# Patient Record
Sex: FEMALE | Race: WHITE | NOT HISPANIC OR LATINO | Employment: OTHER | URBAN - METROPOLITAN AREA
[De-identification: names, ages, dates, MRNs, and addresses within clinical notes are randomized per-mention and may not be internally consistent; named-entity substitution may affect disease eponyms.]

---

## 2017-04-06 PROBLEM — G89.29 BILATERAL CHRONIC KNEE PAIN: Status: ACTIVE | Noted: 2017-04-06

## 2017-04-06 PROBLEM — M25.561 BILATERAL CHRONIC KNEE PAIN: Status: ACTIVE | Noted: 2017-04-06

## 2017-04-06 PROBLEM — M25.562 BILATERAL CHRONIC KNEE PAIN: Status: ACTIVE | Noted: 2017-04-06

## 2017-04-06 PROBLEM — M22.41 PATELLOFEMORAL CHONDROSIS OF RIGHT KNEE: Status: ACTIVE | Noted: 2017-04-06

## 2018-03-17 PROBLEM — R45.851 SUICIDAL IDEATION: Status: ACTIVE | Noted: 2018-03-17

## 2018-09-14 PROBLEM — F41.0 PANIC DISORDER: Status: ACTIVE | Noted: 2018-09-14

## 2018-09-14 PROBLEM — F31.62 MIXED BIPOLAR AFFECTIVE DISORDER, MODERATE: Status: ACTIVE | Noted: 2018-09-14

## 2018-12-03 ENCOUNTER — HOSPITAL ENCOUNTER (INPATIENT)
Facility: HOSPITAL | Age: 28
LOS: 3 days | Discharge: HOME | DRG: 885 | End: 2018-12-06
Attending: PSYCHIATRY & NEUROLOGY | Admitting: PSYCHIATRY & NEUROLOGY

## 2018-12-03 ENCOUNTER — HOSPITAL ENCOUNTER (EMERGENCY)
Facility: HOSPITAL | Age: 28
Discharge: PSYCHIATRIC OR SUBSTANCE USE FACILITY | End: 2018-12-03
Attending: SURGERY

## 2018-12-03 VITALS
HEART RATE: 86 BPM | TEMPERATURE: 98 F | DIASTOLIC BLOOD PRESSURE: 76 MMHG | BODY MASS INDEX: 46.59 KG/M2 | WEIGHT: 280 LBS | RESPIRATION RATE: 18 BRPM | SYSTOLIC BLOOD PRESSURE: 134 MMHG

## 2018-12-03 DIAGNOSIS — F32.A DEPRESSION WITH SUICIDAL IDEATION: ICD-10-CM

## 2018-12-03 DIAGNOSIS — N94.6 MENSTRUAL CRAMPS: ICD-10-CM

## 2018-12-03 DIAGNOSIS — R45.851 DEPRESSION WITH SUICIDAL IDEATION: ICD-10-CM

## 2018-12-03 DIAGNOSIS — F33.2 SEVERE EPISODE OF RECURRENT MAJOR DEPRESSIVE DISORDER, WITHOUT PSYCHOTIC FEATURES: Primary | ICD-10-CM

## 2018-12-03 DIAGNOSIS — F41.9 ANXIETY: ICD-10-CM

## 2018-12-03 DIAGNOSIS — R45.851 DEPRESSION WITH SUICIDAL IDEATION: Primary | ICD-10-CM

## 2018-12-03 DIAGNOSIS — F32.A DEPRESSION WITH SUICIDAL IDEATION: Primary | ICD-10-CM

## 2018-12-03 LAB
ALBUMIN SERPL BCP-MCNC: 3.8 G/DL
ALP SERPL-CCNC: 94 U/L
ALT SERPL W/O P-5'-P-CCNC: 28 U/L
AMPHET+METHAMPHET UR QL: NEGATIVE
ANION GAP SERPL CALC-SCNC: 11 MMOL/L
APAP SERPL-MCNC: <3 UG/ML
AST SERPL-CCNC: 27 U/L
BACTERIA #/AREA URNS HPF: ABNORMAL /HPF
BARBITURATES UR QL SCN>200 NG/ML: NEGATIVE
BASOPHILS # BLD AUTO: 0.04 K/UL
BASOPHILS NFR BLD: 0.3 %
BENZODIAZ UR QL SCN>200 NG/ML: NEGATIVE
BILIRUB SERPL-MCNC: 0.2 MG/DL
BILIRUB UR QL STRIP: NEGATIVE
BUN SERPL-MCNC: 13 MG/DL
BZE UR QL SCN: NEGATIVE
CALCIUM SERPL-MCNC: 8.9 MG/DL
CANNABINOIDS UR QL SCN: NEGATIVE
CHLORIDE SERPL-SCNC: 105 MMOL/L
CLARITY UR: CLEAR
CO2 SERPL-SCNC: 23 MMOL/L
COLOR UR: YELLOW
CREAT SERPL-MCNC: 0.9 MG/DL
CREAT UR-MCNC: 195.1 MG/DL
DIFFERENTIAL METHOD: ABNORMAL
EOSINOPHIL # BLD AUTO: 0.1 K/UL
EOSINOPHIL NFR BLD: 0.9 %
ERYTHROCYTE [DISTWIDTH] IN BLOOD BY AUTOMATED COUNT: 13.1 %
EST. GFR  (AFRICAN AMERICAN): >60 ML/MIN/1.73 M^2
EST. GFR  (NON AFRICAN AMERICAN): >60 ML/MIN/1.73 M^2
ETHANOL SERPL-MCNC: <10 MG/DL
GLUCOSE SERPL-MCNC: 86 MG/DL
GLUCOSE UR QL STRIP: NEGATIVE
HCT VFR BLD AUTO: 40.3 %
HGB BLD-MCNC: 13.2 G/DL
HGB UR QL STRIP: ABNORMAL
HYALINE CASTS #/AREA URNS LPF: 0 /LPF
KETONES UR QL STRIP: NEGATIVE
LEUKOCYTE ESTERASE UR QL STRIP: NEGATIVE
LYMPHOCYTES # BLD AUTO: 2.6 K/UL
LYMPHOCYTES NFR BLD: 22.2 %
MCH RBC QN AUTO: 30.6 PG
MCHC RBC AUTO-ENTMCNC: 32.8 G/DL
MCV RBC AUTO: 94 FL
METHADONE UR QL SCN>300 NG/ML: NEGATIVE
MICROSCOPIC COMMENT: ABNORMAL
MONOCYTES # BLD AUTO: 0.9 K/UL
MONOCYTES NFR BLD: 7.5 %
NEUTROPHILS # BLD AUTO: 8.1 K/UL
NEUTROPHILS NFR BLD: 69.1 %
NITRITE UR QL STRIP: NEGATIVE
OPIATES UR QL SCN: NEGATIVE
PCP UR QL SCN>25 NG/ML: NEGATIVE
PH UR STRIP: 7 [PH] (ref 5–8)
PLATELET # BLD AUTO: 334 K/UL
PMV BLD AUTO: 9.7 FL
POTASSIUM SERPL-SCNC: 4.2 MMOL/L
PROT SERPL-MCNC: 7.5 G/DL
PROT UR QL STRIP: ABNORMAL
RBC # BLD AUTO: 4.31 M/UL
RBC #/AREA URNS HPF: 20 /HPF (ref 0–4)
SALICYLATES SERPL-MCNC: <5 MG/DL
SODIUM SERPL-SCNC: 139 MMOL/L
SP GR UR STRIP: 1.02 (ref 1–1.03)
T4 FREE SERPL-MCNC: 0.83 NG/DL
TOXICOLOGY INFORMATION: NORMAL
TSH SERPL DL<=0.005 MIU/L-ACNC: 1.67 UIU/ML
URN SPEC COLLECT METH UR: ABNORMAL
UROBILINOGEN UR STRIP-ACNC: NEGATIVE EU/DL
WBC # BLD AUTO: 11.78 K/UL
WBC #/AREA URNS HPF: 1 /HPF (ref 0–5)

## 2018-12-03 PROCEDURE — 82306 VITAMIN D 25 HYDROXY: CPT

## 2018-12-03 PROCEDURE — 82607 VITAMIN B-12: CPT

## 2018-12-03 PROCEDURE — 36415 COLL VENOUS BLD VENIPUNCTURE: CPT

## 2018-12-03 PROCEDURE — 80307 DRUG TEST PRSMV CHEM ANLYZR: CPT

## 2018-12-03 PROCEDURE — 80329 ANALGESICS NON-OPIOID 1 OR 2: CPT

## 2018-12-03 PROCEDURE — 80061 LIPID PANEL: CPT

## 2018-12-03 PROCEDURE — 84439 ASSAY OF FREE THYROXINE: CPT

## 2018-12-03 PROCEDURE — 25000003 PHARM REV CODE 250: Performed by: PSYCHIATRY & NEUROLOGY

## 2018-12-03 PROCEDURE — 81000 URINALYSIS NONAUTO W/SCOPE: CPT

## 2018-12-03 PROCEDURE — 80320 DRUG SCREEN QUANTALCOHOLS: CPT

## 2018-12-03 PROCEDURE — 11400000 HC PSYCH PRIVATE ROOM

## 2018-12-03 PROCEDURE — 83036 HEMOGLOBIN GLYCOSYLATED A1C: CPT

## 2018-12-03 PROCEDURE — 99283 EMERGENCY DEPT VISIT LOW MDM: CPT

## 2018-12-03 PROCEDURE — 84443 ASSAY THYROID STIM HORMONE: CPT

## 2018-12-03 PROCEDURE — 84481 FREE ASSAY (FT-3): CPT

## 2018-12-03 PROCEDURE — 82746 ASSAY OF FOLIC ACID SERUM: CPT

## 2018-12-03 PROCEDURE — 85025 COMPLETE CBC W/AUTO DIFF WBC: CPT

## 2018-12-03 PROCEDURE — S4991 NICOTINE PATCH NONLEGEND: HCPCS | Performed by: PSYCHIATRY & NEUROLOGY

## 2018-12-03 PROCEDURE — 80053 COMPREHEN METABOLIC PANEL: CPT

## 2018-12-03 RX ORDER — LORAZEPAM 2 MG/ML
2 INJECTION INTRAMUSCULAR EVERY 4 HOURS PRN
Status: DISCONTINUED | OUTPATIENT
Start: 2018-12-03 | End: 2018-12-03 | Stop reason: HOSPADM

## 2018-12-03 RX ORDER — ACETAMINOPHEN 325 MG/1
650 TABLET ORAL EVERY 6 HOURS PRN
Status: DISCONTINUED | OUTPATIENT
Start: 2018-12-03 | End: 2018-12-06 | Stop reason: HOSPADM

## 2018-12-03 RX ORDER — BUSPIRONE HYDROCHLORIDE 10 MG/1
20 TABLET ORAL 3 TIMES DAILY
Status: ON HOLD | COMMUNITY
End: 2018-12-06 | Stop reason: HOSPADM

## 2018-12-03 RX ORDER — DOCUSATE SODIUM 100 MG/1
100 CAPSULE, LIQUID FILLED ORAL DAILY PRN
Status: DISCONTINUED | OUTPATIENT
Start: 2018-12-03 | End: 2018-12-06 | Stop reason: HOSPADM

## 2018-12-03 RX ORDER — HYDROXYZINE PAMOATE 50 MG/1
100 CAPSULE ORAL NIGHTLY
Status: DISCONTINUED | OUTPATIENT
Start: 2018-12-03 | End: 2018-12-06

## 2018-12-03 RX ORDER — DIPHENHYDRAMINE HYDROCHLORIDE 50 MG/ML
50 INJECTION INTRAMUSCULAR; INTRAVENOUS EVERY 4 HOURS PRN
Status: DISCONTINUED | OUTPATIENT
Start: 2018-12-03 | End: 2018-12-03 | Stop reason: HOSPADM

## 2018-12-03 RX ORDER — HALOPERIDOL 5 MG/ML
5 INJECTION INTRAMUSCULAR EVERY 4 HOURS PRN
Status: DISCONTINUED | OUTPATIENT
Start: 2018-12-03 | End: 2018-12-03 | Stop reason: HOSPADM

## 2018-12-03 RX ORDER — HYDROXYZINE PAMOATE 50 MG/1
50 CAPSULE ORAL EVERY 6 HOURS PRN
Status: DISCONTINUED | OUTPATIENT
Start: 2018-12-03 | End: 2018-12-03

## 2018-12-03 RX ORDER — MAG HYDROX/ALUMINUM HYD/SIMETH 200-200-20
30 SUSPENSION, ORAL (FINAL DOSE FORM) ORAL EVERY 6 HOURS PRN
Status: DISCONTINUED | OUTPATIENT
Start: 2018-12-03 | End: 2018-12-06 | Stop reason: HOSPADM

## 2018-12-03 RX ORDER — FOLIC ACID 1 MG/1
1 TABLET ORAL DAILY
Status: DISCONTINUED | OUTPATIENT
Start: 2018-12-04 | End: 2018-12-06 | Stop reason: HOSPADM

## 2018-12-03 RX ORDER — IBUPROFEN 200 MG
1 TABLET ORAL DAILY
Status: DISCONTINUED | OUTPATIENT
Start: 2018-12-03 | End: 2018-12-06 | Stop reason: HOSPADM

## 2018-12-03 RX ORDER — OLANZAPINE 10 MG/2ML
10 INJECTION, POWDER, FOR SOLUTION INTRAMUSCULAR EVERY 4 HOURS PRN
Status: DISCONTINUED | OUTPATIENT
Start: 2018-12-03 | End: 2018-12-06 | Stop reason: HOSPADM

## 2018-12-03 RX ORDER — IBUPROFEN 200 MG
1 TABLET ORAL
Status: DISCONTINUED | OUTPATIENT
Start: 2018-12-03 | End: 2018-12-03 | Stop reason: HOSPADM

## 2018-12-03 RX ORDER — OLANZAPINE 10 MG/1
10 TABLET ORAL EVERY 4 HOURS PRN
Status: DISCONTINUED | OUTPATIENT
Start: 2018-12-03 | End: 2018-12-06 | Stop reason: HOSPADM

## 2018-12-03 RX ORDER — ALPRAZOLAM 1 MG/1
1 TABLET ORAL 2 TIMES DAILY PRN
Status: ON HOLD | COMMUNITY
End: 2018-12-06 | Stop reason: HOSPADM

## 2018-12-03 RX ORDER — LOPERAMIDE HYDROCHLORIDE 2 MG/1
2 CAPSULE ORAL
Status: DISCONTINUED | OUTPATIENT
Start: 2018-12-03 | End: 2018-12-06 | Stop reason: HOSPADM

## 2018-12-03 RX ADMIN — HYDROXYZINE PAMOATE 100 MG: 50 CAPSULE ORAL at 08:12

## 2018-12-03 RX ADMIN — NICOTINE 1 PATCH: 14 PATCH, EXTENDED RELEASE TRANSDERMAL at 06:12

## 2018-12-03 NOTE — ED NOTES
"Patient denies any SI or HI at this time.  When asked patient states "I'm depressed and I went to the clinic to get another scrip of xanax and they sent me here."    "

## 2018-12-03 NOTE — ED NOTES
Patient has been accepted to our [facility].  Report given to Johan LEARY.  Patient and spouse were informed.  No questions or concerns at this time.

## 2018-12-03 NOTE — ED PROVIDER NOTES
"[facility]                                                 Chief Complaint  28 y.o. female with Suicidal    History of Present Illness  Keyla Caal presents to the emergency room with suicidal ideation  Patient is suicidal and depressed, patient has a history of bipolar disorder  Patient was discussing these issues with her local mental health clinic MD  Patient was advised to come to the ER for full evaluation/BHU placement  Patient is alert and cooperative, states she suffers with depression issues  Patient has multiple personality disorder per her history given in the ER now  Patient also has a history of severe anxiety, chronic pain issues noted today    The history is provided by the patient   device was not used during this ER visit    Past Medical History    Anxiety     [facility]     Arthritis     Bipolar disorder     [facility]    Chronic knee pain     Depression     '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, anger issues."     History of psychiatric hospitalization     [facility]     Hx of psychiatric care     Buspar, Vistril, Xanax, Paxil    Migraines     Primary Care doctor at [facility]     Oppositional defiant disorder     'I spent only one day at [facility] and was discharged."    Panic disorder     [facility]    Psychiatric exam requested by authority     [facility]    Psychiatric problem     Depression and anxiety    Suicide attempt     Interrupted Attempt    Therapy     [facility]      Surgeries:  Bartholin gland, knee surgery, wisdom tooth  No Known Allergies     Review of Systems and Physical Exam      Review of Systems  -- Constitution - no fever, denies fatigue, no weakness, no chills  -- Eyes - no tearing or redness, no visual disturbance  -- Ear, Nose - no tinnitus or earache, no nasal congestion or " discharge  -- Mouth,Throat - no sore throat, no toothache, normal voice, normal swallowing  -- Respiratory - denies cough and congestion, no shortness of breath, no TERESA  -- Cardiovascular - denies chest pain, no palpitations, denies claudication  -- Gastrointestinal - denies abdominal pain, nausea, vomiting, or diarrhea  -- Genitourinary - no dysuria, denies flank pain, no hematuria, no STD risk  -- Musculoskeletal - denies back pain, negative for myalgias and arthralgias   -- Neurological - no headache, denies weakness or seizure; no LOC  -- Skin - denies pallor, rash, or changes in skin. no hives or welts noted  -- Psychiatric - suicidal ideation and depression, no psychosis or fractured thought noted     Vital Signs  Her blood pressure is 134/76 and her pulse is 86. Her respiration is 18.     Physical Exam  -- Nursing note and vitals reviewed  -- Constitutional: Appears well-developed and well-nourished  -- Head: Atraumatic. Normocephalic. No obvious abnormality  -- Eyes: Pupils are equal and reactive to light. Normal conjunctiva and lids  -- Cardiac: Normal rate, regular rhythm and normal heart sounds  -- Pulmonary: Normal respiratory effort, breath sounds clear to auscultation  -- Abdominal: Soft, no tenderness. Normal bowel sounds. Normal liver edge  -- Musculoskeletal: Normal range of motion, no effusions. Joints stable   -- Neurological: No focal deficits. Showed good interaction with staff  -- Vascular: Posterior tibial, dorsalis pedis and radial pulses 2+ bilaterally    -- Lymphatics: No cervical or peripheral lymphadenopathy. No edema noted  -- Skin: Warm and dry. No evidence of rash or cellulitis    Emergency Room Course      Diagnosis  -- The encounter diagnosis was Depression with suicidal ideation.    Disposition and Plan  -- Disposition: PEC  -- Condition: stable  -- Pt will be placed in a psychiatric facility  -- The patient is a direct observation until placement  -- The patient has been made aware  of his or her rights while under PEC in the ER  -- All questions have been answered; will follow ER protocols until placement    Lab work was performed in the ER, this patient is cleared for psychiatric placement     This note is dictated on Dragon Natural Speaking word recognition program.  There are word recognition mistakes that are occasionally missed on review.          Ted Tinoco MD  12/03/18 [id number]

## 2018-12-03 NOTE — ED NOTES
Several small healed cuts on left neck.  Patient reports using a razor blade to cut herself last week.

## 2018-12-03 NOTE — ED TRIAGE NOTES
Patient presents to the ER via [facility] with PEC from behavior clinic in [place].  Patient cut her neck a week ago.  Patient has superficial cuts to neck.  Patient reports depression.

## 2018-12-04 PROBLEM — N94.6 MENSTRUAL CRAMPS: Status: ACTIVE | Noted: 2018-12-04

## 2018-12-04 LAB
25(OH)D3+25(OH)D2 SERPL-MCNC: 22 NG/ML
CHOLEST SERPL-MCNC: 217 MG/DL
CHOLEST/HDLC SERPL: 5.2 {RATIO}
ESTIMATED AVG GLUCOSE: 103 MG/DL
FOLATE SERPL-MCNC: 8.7 NG/ML
HBA1C MFR BLD HPLC: 5.2 %
HDLC SERPL-MCNC: 42 MG/DL
HDLC SERPL: 19.4 %
LDLC SERPL CALC-MCNC: 126.6 MG/DL
NONHDLC SERPL-MCNC: 175 MG/DL
T3FREE SERPL-MCNC: 2.7 PG/ML
TRIGL SERPL-MCNC: 242 MG/DL
VIT B12 SERPL-MCNC: 290 PG/ML

## 2018-12-04 PROCEDURE — 36415 COLL VENOUS BLD VENIPUNCTURE: CPT

## 2018-12-04 PROCEDURE — 99222 1ST HOSP IP/OBS MODERATE 55: CPT | Mod: ,,, | Performed by: NURSE PRACTITIONER

## 2018-12-04 PROCEDURE — S4991 NICOTINE PATCH NONLEGEND: HCPCS | Performed by: PSYCHIATRY & NEUROLOGY

## 2018-12-04 PROCEDURE — 11400000 HC PSYCH PRIVATE ROOM

## 2018-12-04 PROCEDURE — 90833 PSYTX W PT W E/M 30 MIN: CPT | Mod: ,,, | Performed by: PSYCHIATRY & NEUROLOGY

## 2018-12-04 PROCEDURE — 99223 1ST HOSP IP/OBS HIGH 75: CPT | Mod: ,,, | Performed by: PSYCHIATRY & NEUROLOGY

## 2018-12-04 PROCEDURE — 25000003 PHARM REV CODE 250: Performed by: PSYCHIATRY & NEUROLOGY

## 2018-12-04 RX ORDER — LURASIDONE HYDROCHLORIDE 40 MG/1
40 TABLET, FILM COATED ORAL
Status: DISCONTINUED | OUTPATIENT
Start: 2018-12-04 | End: 2018-12-06 | Stop reason: HOSPADM

## 2018-12-04 RX ORDER — TRAZODONE HYDROCHLORIDE 100 MG/1
100 TABLET ORAL NIGHTLY
Status: DISCONTINUED | OUTPATIENT
Start: 2018-12-04 | End: 2018-12-05

## 2018-12-04 RX ORDER — CARBAMAZEPINE 200 MG/1
200 TABLET ORAL 2 TIMES DAILY
Status: DISCONTINUED | OUTPATIENT
Start: 2018-12-04 | End: 2018-12-06 | Stop reason: HOSPADM

## 2018-12-04 RX ORDER — HYDROXYZINE PAMOATE 50 MG/1
50 CAPSULE ORAL EVERY 8 HOURS PRN
Status: DISCONTINUED | OUTPATIENT
Start: 2018-12-04 | End: 2018-12-06 | Stop reason: HOSPADM

## 2018-12-04 RX ORDER — PRAZOSIN HYDROCHLORIDE 1 MG/1
2 CAPSULE ORAL NIGHTLY
Status: DISCONTINUED | OUTPATIENT
Start: 2018-12-04 | End: 2018-12-06 | Stop reason: HOSPADM

## 2018-12-04 RX ORDER — FLUOXETINE HYDROCHLORIDE 20 MG/1
60 CAPSULE ORAL DAILY
Status: DISCONTINUED | OUTPATIENT
Start: 2018-12-04 | End: 2018-12-06 | Stop reason: HOSPADM

## 2018-12-04 RX ORDER — IBUPROFEN 800 MG/1
800 TABLET ORAL EVERY 6 HOURS PRN
Status: DISCONTINUED | OUTPATIENT
Start: 2018-12-04 | End: 2018-12-06 | Stop reason: HOSPADM

## 2018-12-04 RX ADMIN — CARBAMAZEPINE 200 MG: 200 TABLET ORAL at 10:12

## 2018-12-04 RX ADMIN — PRAZOSIN HYDROCHLORIDE 2 MG: 1 CAPSULE ORAL at 08:12

## 2018-12-04 RX ADMIN — ACETAMINOPHEN 650 MG: 325 TABLET ORAL at 04:12

## 2018-12-04 RX ADMIN — HYDROXYZINE PAMOATE 100 MG: 50 CAPSULE ORAL at 08:12

## 2018-12-04 RX ADMIN — IBUPROFEN 800 MG: 800 TABLET ORAL at 10:12

## 2018-12-04 RX ADMIN — LURASIDONE HYDROCHLORIDE 40 MG: 40 TABLET, FILM COATED ORAL at 04:12

## 2018-12-04 RX ADMIN — THERA TABS 1 TABLET: TAB at 08:12

## 2018-12-04 RX ADMIN — IBUPROFEN 800 MG: 800 TABLET ORAL at 07:12

## 2018-12-04 RX ADMIN — TRAZODONE HYDROCHLORIDE 100 MG: 100 TABLET ORAL at 08:12

## 2018-12-04 RX ADMIN — FOLIC ACID 1 MG: 1 TABLET ORAL at 08:12

## 2018-12-04 RX ADMIN — CARBAMAZEPINE 200 MG: 200 TABLET ORAL at 08:12

## 2018-12-04 RX ADMIN — NICOTINE 1 PATCH: 14 PATCH, EXTENDED RELEASE TRANSDERMAL at 09:12

## 2018-12-04 RX ADMIN — FLUOXETINE HYDROCHLORIDE 60 MG: 20 CAPSULE ORAL at 10:12

## 2018-12-04 NOTE — PLAN OF CARE
Problem: Patient Care Overview (Adult)  Goal: Plan of Care Review  Outcome: Ongoing (interventions implemented as appropriate)  Goals: Pt to increase PO intake to 50% while in the hospital   Nutrition Goal Status: new    Nutrition Discharge Planning: Pt to continue on a regular diet and taking multivitamins whn leaving the hospital. Consider healthy fat options if triglycerides and cholesterol remain elevated.

## 2018-12-04 NOTE — PLAN OF CARE
Problem: Patient Care Overview (Adult)  Goal: Plan of Care Review  Outcome: Ongoing (interventions implemented as appropriate)  Lying quiet in bed, eyes closed, respiration even and unlabored, appearing asleep.  Slept well all shift up until about 0425.  Awoke and came to nursing station with c/o headache.  Received Tylenol 650 mg po at 0425.  Pt returned to bed and to sleep by 0500 .  Safety and precautions maintained with rounds every 15 minutes, bed is fixed in a low position and pathways kept clear.  No fall occurred.

## 2018-12-04 NOTE — HPI
29 YO WF with PMHX of bipolar depression, multiple perstonality disorder, severe anxiety, and chronic pain issues presented to to the emergency room with suicidal ideation  Advised to present to ER after discussing issues with  her local mental health clinic MD,  for full evaluation/BHU placement    BP Readings from Last 3 Encounters:   12/04/18 138/76   12/03/18 134/76   09/14/18 128/84

## 2018-12-04 NOTE — PLAN OF CARE
"Problem: Patient Care Overview (Adult)  Goal: Individualization & Mutuality  Outcome: Ongoing (interventions implemented as appropriate)  Pt presented to our Er after being seen [facility] PEC'd for depression and SI , accepted by Dr. Tao Elizabeth, pt up to unit via security and MHT, property and body search performed no paraphernalia found,  pt states 'got into argument with wife Maritza last week after bad day of work and took razor and made small superficial scratches to neck and forearms", pt states" need help fix my medication because I hate feeling like this", pt blunt affect cooperative, speaks softly, pt given tobacco cessation education and pt to receive out pt referral for tobacco cessation, pt contracted for safety, safety precautions maintained, will continue to monitor            "

## 2018-12-04 NOTE — PLAN OF CARE
Problem: Patient Care Overview (Adult)  Goal: Interdisciplinary Rounds/Family Conf  TREATMENT TEAM  Chief Complaint:  Patient admitted with depression SI     Current:  Patient attended treatment team dressed in personal clothes. Patient presents with blunted affect, cooperative mood.   I went to my doctor's appointment and he decided to put me in here. He said he had never seen me this stressed out. Patient states she sees Demetrius Noriega at [facility] and has been diagnosed with biopolar depression.   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 Is unsure what medications she is on.      Plan:  Will meet with staff 1:1 later.   Patient will discharge to home   Patient will FU with [facility]

## 2018-12-04 NOTE — PROGRESS NOTES
" [facility]  Adult Nutrition  Progress Note    SUMMARY       Recommendations    Recommendation/Intervention: (P) Recommend pt to continue on regular diet and taking multivitamins while in the hospital and at home. Continue to moniotor chlesterol/triglycerides and consider heart healthy options/education if these remain elevated  Goals: (P) Pt to increase PO intake to 50% while in the hospital   Nutrition Goal Status: (P) new    Reason for Assessment    Reason for Assessment: consult  Diagnosis: (Depression with suicidal ideation )  General Information Comments: NFPE not completed, not appropriate due to psych status  Nutrition Discharge Planning: Pt to continue on a regular diet and taking multivitamins whn leaving the hospital. Consider healthy fat options if triglycerides and cholesterol remain elevated.     Nutrition Risk Screen    Nutrition Risk Screen: no indicators present    Nutrition/Diet History    Patient Reported Diet/Restrictions/Preferences: general  Typical Food/Fluid Intake: 25% breakfast   Do you have any cultural, spiritual, Amish conflicts, given your current situation?: Pentecostal  Food Allergies: NKFA    Anthropometrics    Temp: 97.2 °F (36.2 °C)  Height: 5' 5" (165.1 cm)  Height (inches): 65 in  Weight Method: Standard Scale  Weight: 127.9 kg (281 lb 15.5 oz)  Weight (lb): 281.97 lb  Ideal Body Weight (IBW), Female: 125 lb  % Ideal Body Weight, Female (lb): 225.58 lb  BMI (Calculated): 47  BMI Grade: greater than 40 - morbid obesity       Lab/Procedures/Meds    Pertinent Labs Reviewed: reviewed  Pertinent Labs Comments: 217 Cholesterol, triglycerides 242, HDL 19.4  Pertinent Medications Reviewed: reviewed  Pertinent Medications Comments: Multivitamin, Folic acid     Physical Findings/Assessment    Overall Physical Appearance: nourished, overweight  Oral/Mouth Cavity: no grinding or difficulty chewing  Skin: intact    Estimated/Assessed Needs    Weight Used For Calorie " Calculations: 127.9 kg (281 lb 15.5 oz)  Energy Calorie Requirements (kcal): 2000(NO AF)  Energy Need Method: [place]-St Jeor  Protein Requirements: 102-128(0.8-1gm protein )  Weight Used For Protein Calculations: 127.9 kg (281 lb 15.5 oz)  Fluid Requirements (mL): 2000  Fluid Need Method: RDA Method  RDA Method (mL): 2000         Nutrition Prescription Ordered    Current Diet Order: Regular    Evaluation of Received Nutrient/Fluid Intake    % Kcal Needs: 25%  % Protein Needs: 25%  Tolerance: tolerating  % Intake of Estimated Energy Needs: 0 - 25 %  % Meal Intake: 0 - 25 %    Nutrition Risk    Level of Risk/Frequency of Follow-up: low     Assessment and Plan  Nutrition Problem  Inadequate oral intake    Related to (etiology):   Pt not eating all meals     Signs and Symptoms (as evidenced by):   Pt PO intake at 25%    Interventions  General/healthful diet  Multivitamin/mineral supplement therapy    Recommendations (treatment strategy):  Recommend pt to continue on regular diet and taking multivitamins while in the hospital and at home. Continue to moniotor chlesterol/triglycerides and consider heart healthy options/education if these remain elevated.    Nutrition Diagnosis Status:   New      Monitor and Evaluation    Food and Nutrient Intake: food and beverage intake  Food and Nutrient Adminstration: diet order  Knowledge/Beliefs/Attitudes: food and nutrition knowledge/skill, beliefs and attitudes  Physical Activity and Function: nutrition-related ADLs and IADLs  Anthropometric Measurements: body mass index, weight change, weight, height/length  Biochemical Data, Medical Tests and Procedures: lipid profile, inflammatory profile, glucose/endocrine profile, gastrointestinal profile, electrolyte and renal panel  Nutrition-Focused Physical Findings: overall appearance     Nutrition Follow-Up    RD Follow-up?: Yes
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
## 2018-12-04 NOTE — PLAN OF CARE
Problem: Patient Care Overview (Adult)  Goal: Plan of Care Review  Outcome: Ongoing (interventions implemented as appropriate)  Pt avoiding social contact, resting in bed mostly waiting to see doctor in morning, denies SI at this time, safety precautions maintained, will continue to monitor

## 2018-12-04 NOTE — SUBJECTIVE & OBJECTIVE
"Past Medical History:   Diagnosis Date    Anxiety 2015    [facility]     Arthritis     Bipolar disorder 2016    [facility]    Chronic knee pain     Depression 2016    '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, anger issues."     History of psychiatric hospitalization 2015    [facility]     Hx of psychiatric care 2015    Buspar, Vistril, Xanax, Paxil    Elaine     Migraines 2014    Primary Care doctor at [facility]     Oppositional defiant disorder 1999    'I spent only one day at [facility] and was discharged."    Panic disorder 2015    [facility]    Psychiatric exam requested by authority 2015    [facility]    Psychiatric problem 2015    Depression and anxiety    Suicide attempt 2015    [facility]    Therapy 2016    [facility]       Past Surgical History:   Procedure Laterality Date    BARTHOLIN GLAND CYST EXCISION      KNEE SURGERY      right    WISDOM TOOTH EXTRACTION         Review of patient's allergies indicates:  No Known Allergies    Current Facility-Administered Medications on File Prior to Encounter   Medication    [DISCONTINUED] diphenhydrAMINE injection 50 mg    [DISCONTINUED] haloperidol lactate injection 5 mg    [DISCONTINUED] lorazepam injection 2 mg    [DISCONTINUED] nicotine 21 mg/24 hr 1 patch     Current Outpatient Medications on File Prior to Encounter   Medication Sig    ALPRAZolam (XANAX) 1 MG tablet Take 1 mg by mouth 2 (two) times daily as needed for Anxiety.    busPIRone (BUSPAR) 10 MG tablet Take 20 mg by mouth 3 (three) times daily.     carBAMazepine (TEGRETOL) 200 mg tablet Take 200 mg by mouth 2 (two) times daily.    diclofenac (VOLTAREN) 50 MG EC tablet Take 1 tablet (50 mg total) by mouth 2 (two) times daily.    FLUoxetine (PROZAC) 20 MG capsule Take 1 capsule (20 mg total) by mouth 2 (two) times daily. (Patient taking " differently: Take 40 mg by mouth 2 (two) times daily. )    furosemide (LASIX) 20 MG tablet Take 1 tablet (20 mg total) by mouth daily as needed.    hydrOXYzine pamoate (VISTARIL) 50 MG Cap Take 1 capsule (50 mg total) by mouth every 8 (eight) hours as needed (first choice).    lurasidone (LATUDA) 40 mg Tab tablet Take 80 mg by mouth once daily.     mirtazapine (REMERON) 30 MG tablet Take 1 tablet (30 mg total) by mouth every evening.    prazosin (MINIPRESS) 1 MG Cap Take 1 capsule (1 mg total) by mouth every evening.    traZODone (DESYREL) 50 MG tablet Take 1 tablet (50 mg total) by mouth every evening. (Patient taking differently: Take 100 mg by mouth. )     Family History     Problem Relation (Age of Onset)    Anxiety disorder Mother    Dementia Maternal Grandfather    Hypertension Mother, Father    No Known Problems Sister, Maternal Aunt, Paternal Aunt, Maternal Uncle, Paternal Uncle, Maternal Grandmother, Paternal Grandfather, Paternal Grandmother, Cousin, Maternal Aunt, Maternal Aunt, Maternal Aunt, Maternal Uncle        Tobacco Use    Smoking status: Former Smoker     Packs/day: 0.50     Years: 12.00     Pack years: 6.00     Types: Vaping with nicotine     Start date: 2011     Last attempt to quit: 2017     Years since quittin.3    Smokeless tobacco: Former User     Types: Snuff   Substance and Sexual Activity    Alcohol use: Yes     Alcohol/week: 1.2 oz     Types: 1 Glasses of wine, 1 Standard drinks or equivalent per week     Comment: occasionally about once a month    Drug use: Yes     Types: Marijuana     Comment: very occasionally    Sexual activity: Yes     Partners: Female     Birth control/protection: None     Review of Systems   Constitutional: Negative for chills and fever.   HENT: Negative for congestion and sore throat.    Respiratory: Negative for cough, chest tightness and shortness of breath.    Cardiovascular: Negative for chest pain, palpitations and leg swelling.    Gastrointestinal: Negative for abdominal pain, diarrhea, nausea and vomiting.   Genitourinary: Negative for flank pain, frequency and hematuria.   Musculoskeletal: Negative for back pain and neck pain.   Skin: Negative for rash and wound.   Neurological: Negative for dizziness, seizures, syncope and headaches.   Psychiatric/Behavioral: Positive for suicidal ideas. Negative for agitation and confusion.     Objective:     Vital Signs (Most Recent):  Temp: 97.2 °F (36.2 °C) (12/04/18 0740)  Pulse: 67 (12/04/18 0740)  Resp: 18 (12/04/18 0740)  BP: 138/76 (12/04/18 0740) Vital Signs (24h Range):  Temp:  [96.3 °F (35.7 °C)-98 °F (36.7 °C)] 97.2 °F (36.2 °C)  Pulse:  [67-86] 67  Resp:  [18-20] 18  BP: (126-146)/(61-76) 138/76     Weight: 127.9 kg (281 lb 15.5 oz)  Body mass index is 46.92 kg/m².    Physical Exam   Constitutional: She is oriented to person, place, and time. She appears well-developed and well-nourished. No distress.   HENT:   Head: Normocephalic and atraumatic.   Eyes: Pupils are equal, round, and reactive to light.   Neck: No thyromegaly present.   Cardiovascular: Normal rate, regular rhythm, normal heart sounds and intact distal pulses.   No murmur heard.  Pulmonary/Chest: Effort normal and breath sounds normal. No respiratory distress. She has no wheezes. She has no rales.   Abdominal: Soft. Bowel sounds are normal. She exhibits no distension and no mass. There is no tenderness.   Genitourinary:   Genitourinary Comments: C/o abd cramps, currently menstuating   Musculoskeletal: Normal range of motion. She exhibits no edema or deformity.   Lymphadenopathy:     She has no cervical adenopathy.   Neurological: She is alert and oriented to person, place, and time.   CN II visual fields full to confrontation  CN III, Iv, VI- pupils ERRL   CN III- no palsy  Nystagmus; none   Diplopia- none  ophthalmoparesis- none  CN V- facial sensation intact  CN VII- facial expression full and symmetric  CNVIII- normal  CN  IV-Normal  CN X- normal  CN XI- Normal   CN XII normal      Skin: Skin is warm and dry. No rash noted. No erythema.   Psychiatric:   Laying in bed sleeping; awakened easily, cooperative;    Nursing note and vitals reviewed.      Significant Labs:   UPT  Results for orders placed or performed during the hospital encounter of 03/16/18   POCT urine pregnancy   Result Value Ref Range    POC Preg Test, Ur Negative Negative     Acceptable Yes      U/A  Results for orders placed or performed during the hospital encounter of 03/16/18   Urinalysis - clean catch   Result Value Ref Range    Specimen UA Urine, Clean Catch     Color, UA Yellow Yellow, Straw, Layne    Appearance, UA Clear Clear    pH, UA 5.0 5.0 - 8.0    Specific Gravity, UA >1.030 (A) 1.005 - 1.030    Protein, UA Negative Negative    Glucose, UA Negative Negative    Ketones, UA Negative Negative    Bilirubin (UA) Negative Negative    Occult Blood UA Negative Negative    Nitrite, UA Negative Negative    Urobilinogen, UA Negative <2.0 EU/dL    Leukocytes, UA Negative Negative     UDS  Results for orders placed or performed during the hospital encounter of 12/03/18   Drug screen panel, emergency   Result Value Ref Range    Benzodiazepines Negative     Methadone metabolites Negative     Cocaine (Metab.) Negative     Opiate Scrn, Ur Negative     Barbiturate Screen, Ur Negative     Amphetamine Screen, Ur Negative     THC Negative     Phencyclidine Negative     Creatinine, Random Ur 195.1 15.0 - 325.0 mg/dL    Toxicology Information SEE COMMENT      CBC  Results for orders placed or performed during the hospital encounter of 12/03/18   CBC auto differential   Result Value Ref Range    WBC 11.78 3.90 - 12.70 K/uL    RBC 4.31 4.00 - 5.40 M/uL    Hemoglobin 13.2 12.0 - 16.0 g/dL    Hematocrit 40.3 37.0 - 48.5 %    MCV 94 82 - 98 fL    MCH 30.6 27.0 - 31.0 pg    MCHC 32.8 32.0 - 36.0 g/dL    RDW 13.1 11.5 - 14.5 %    Platelets 334 150 - 350 K/uL    MPV 9.7  9.2 - 12.9 fL    Gran # (ANC) 8.1 (H) 1.8 - 7.7 K/uL    Lymph # 2.6 1.0 - 4.8 K/uL    Mono # 0.9 0.3 - 1.0 K/uL    Eos # 0.1 0.0 - 0.5 K/uL    Baso # 0.04 0.00 - 0.20 K/uL    Gran% 69.1 38.0 - 73.0 %    Lymph% 22.2 18.0 - 48.0 %    Mono% 7.5 4.0 - 15.0 %    Eosinophil% 0.9 0.0 - 8.0 %    Basophil% 0.3 0.0 - 1.9 %    Differential Method Automated      CMP  Results for orders placed or performed during the hospital encounter of 12/03/18   Comprehensive metabolic panel   Result Value Ref Range    Sodium 139 136 - 145 mmol/L    Potassium 4.2 3.5 - 5.1 mmol/L    Chloride 105 95 - 110 mmol/L    CO2 23 23 - 29 mmol/L    Glucose 86 70 - 110 mg/dL    BUN, Bld 13 6 - 20 mg/dL    Creatinine 0.9 0.5 - 1.4 mg/dL    Calcium 8.9 8.7 - 10.5 mg/dL    Total Protein 7.5 6.0 - 8.4 g/dL    Albumin 3.8 3.5 - 5.2 g/dL    Total Bilirubin 0.2 0.1 - 1.0 mg/dL    Alkaline Phosphatase 94 55 - 135 U/L    AST 27 10 - 40 U/L    ALT 28 10 - 44 U/L    Anion Gap 11 8 - 16 mmol/L    eGFR if African American >60 >60 mL/min/1.73 m^2    eGFR if non African American >60 >60 mL/min/1.73 m^2     TSH  Results for orders placed or performed during the hospital encounter of 12/03/18   TSH   Result Value Ref Range    TSH 1.669 0.400 - 4.000 uIU/mL     ETOH  Results for orders placed or performed during the hospital encounter of 12/03/18   Ethanol   Result Value Ref Range    Alcohol, Medical, Serum <10 <10 mg/dL     Salicylate  Results for orders placed or performed during the hospital encounter of 12/03/18   Salicylate level   Result Value Ref Range    Salicylate Lvl <5.0 (L) 15.0 - 30.0 mg/dL     Acetaminophen  Results for orders placed or performed during the hospital encounter of 12/03/18   Acetaminophen level   Result Value Ref Range    Acetaminophen (Tylenol), Serum <3.0 (L) 10.0 - 20.0 ug/mL             Significant Imaging:none

## 2018-12-04 NOTE — PROGRESS NOTES
12/04/18 1040   [facility] Group Therapy   Group Name Leisure Skills Training   Specific Interventions Cognitive Stimulation Training   Participation Level None   Patient did not attend group

## 2018-12-04 NOTE — PLAN OF CARE
Problem: Overarching Goals (Adult)  Goal: Optimized Coping Skills in Response to Life Stressors    Intervention: Promote Effective Coping Strategies  Group Note    Behavior:  Patient attended group and participated.  Patient presents with depressed mood and affect but improved during the session         Intervention:  Managing Depression - patients listed their stressors, goals and priorities. Discussed managing depression, knowing your stressors and triggers, setting goals for direction, pursuing and identifying what is important in your life.         Response:  Patient states money, and gurjit are her big stressors. Her goals include getting back on the right meds, finding the right job. patient states being a good wife, and her family are her priorities      Plan:     Will continue to encourage participation in group.  Will meet 1:1 with patient later

## 2018-12-04 NOTE — CONSULTS
"[facility] Hospital Medicine  Consult Note    Patient Name: Keyla Caal  MRN: [id number]  Admission Date: 12/3/2018  Hospital Length of Stay: 1 days  Attending Physician: Tao Elizabeth MD   Primary Care Provider: Italia Faustin NP           Patient information was obtained from patient and ER records.     Inpatient consult to [facility] for History and Physical  Consult performed by: Camille Paz NP  Consult ordered by: Isidoro Forte MD        Subjective:     Principal Problem: <principal problem not specified>    Chief Complaint: No chief complaint on file.       HPI:   29 YO WF with PMHX of bipolar depression, multiple perstonality disorder, severe anxiety, and chronic pain issues presented to to the emergency room with suicidal ideation  Advised to present to ER after discussing issues with  her local mental health clinic MD,  for full evaluation/BHU placement    BP Readings from Last 3 Encounters:   12/04/18 138/76   12/03/18 134/76   09/14/18 128/84       Past Medical History:   Diagnosis Date    Anxiety 2015    [facility]     Arthritis     Bipolar disorder 2016    [facility]    Chronic knee pain     Depression 2016    '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, anger issues."     History of psychiatric hospitalization 2015    [facility]     Hx of psychiatric care 2015    Buspar, Vistril, Xanax, Paxil    Elaine     Migraines 2014    Primary Care doctor at [facility]     Oppositional defiant disorder 1999    'I spent only one day at [facility] and was discharged."    Panic disorder 2015    [facility]    Psychiatric exam requested by authority 2015    [facility]    Psychiatric problem 2015    Depression and anxiety    Suicide attempt 2015    [facility]    Therapy 2016    [facility]       Past Surgical " History:   Procedure Laterality Date    BARTHOLIN GLAND CYST EXCISION      KNEE SURGERY      right    WISDOM TOOTH EXTRACTION         Review of patient's allergies indicates:  No Known Allergies    Current Facility-Administered Medications on File Prior to Encounter   Medication    [DISCONTINUED] diphenhydrAMINE injection 50 mg    [DISCONTINUED] haloperidol lactate injection 5 mg    [DISCONTINUED] lorazepam injection 2 mg    [DISCONTINUED] nicotine 21 mg/24 hr 1 patch     Current Outpatient Medications on File Prior to Encounter   Medication Sig    ALPRAZolam (XANAX) 1 MG tablet Take 1 mg by mouth 2 (two) times daily as needed for Anxiety.    busPIRone (BUSPAR) 10 MG tablet Take 20 mg by mouth 3 (three) times daily.     carBAMazepine (TEGRETOL) 200 mg tablet Take 200 mg by mouth 2 (two) times daily.    diclofenac (VOLTAREN) 50 MG EC tablet Take 1 tablet (50 mg total) by mouth 2 (two) times daily.    FLUoxetine (PROZAC) 20 MG capsule Take 1 capsule (20 mg total) by mouth 2 (two) times daily. (Patient taking differently: Take 40 mg by mouth 2 (two) times daily. )    furosemide (LASIX) 20 MG tablet Take 1 tablet (20 mg total) by mouth daily as needed.    hydrOXYzine pamoate (VISTARIL) 50 MG Cap Take 1 capsule (50 mg total) by mouth every 8 (eight) hours as needed (first choice).    lurasidone (LATUDA) 40 mg Tab tablet Take 80 mg by mouth once daily.     mirtazapine (REMERON) 30 MG tablet Take 1 tablet (30 mg total) by mouth every evening.    prazosin (MINIPRESS) 1 MG Cap Take 1 capsule (1 mg total) by mouth every evening.    traZODone (DESYREL) 50 MG tablet Take 1 tablet (50 mg total) by mouth every evening. (Patient taking differently: Take 100 mg by mouth. )     Family History     Problem Relation (Age of Onset)    Anxiety disorder Mother    Dementia Maternal Grandfather    Hypertension Mother, Father    No Known Problems Sister, Maternal Aunt, Paternal Aunt, Maternal Uncle, Paternal Uncle, Maternal  Grandmother, Paternal Grandfather, Paternal Grandmother, Cousin, Maternal Aunt, Maternal Aunt, Maternal Aunt, Maternal Uncle        Tobacco Use    Smoking status: Former Smoker     Packs/day: 0.50     Years: 12.00     Pack years: 6.00     Types: Vaping with nicotine     Start date: 2011     Last attempt to quit: 2017     Years since quittin.3    Smokeless tobacco: Former User     Types: Snuff   Substance and Sexual Activity    Alcohol use: Yes     Alcohol/week: 1.2 oz     Types: 1 Glasses of wine, 1 Standard drinks or equivalent per week     Comment: occasionally about once a month    Drug use: Yes     Types: Marijuana     Comment: very occasionally    Sexual activity: Yes     Partners: Female     Birth control/protection: None     Review of Systems   Constitutional: Negative for chills and fever.   HENT: Negative for congestion and sore throat.    Respiratory: Negative for cough, chest tightness and shortness of breath.    Cardiovascular: Negative for chest pain, palpitations and leg swelling.   Gastrointestinal: Negative for abdominal pain, diarrhea, nausea and vomiting.   Genitourinary: Negative for flank pain, frequency and hematuria.   Musculoskeletal: Negative for back pain and neck pain.   Skin: Negative for rash and wound.   Neurological: Negative for dizziness, seizures, syncope and headaches.   Psychiatric/Behavioral: Positive for suicidal ideas. Negative for agitation and confusion.     Objective:     Vital Signs (Most Recent):  Temp: 97.2 °F (36.2 °C) (18 0740)  Pulse: 67 (18 0740)  Resp: 18 (18 0740)  BP: 138/76 (18 0740) Vital Signs (24h Range):  Temp:  [96.3 °F (35.7 °C)-98 °F (36.7 °C)] 97.2 °F (36.2 °C)  Pulse:  [67-86] 67  Resp:  [18-20] 18  BP: (126-146)/(61-76) 138/76     Weight: 127.9 kg (281 lb 15.5 oz)  Body mass index is 46.92 kg/m².    Physical Exam   Constitutional: She is oriented to person, place, and time. She appears well-developed and  well-nourished. No distress.   HENT:   Head: Normocephalic and atraumatic.   Eyes: Pupils are equal, round, and reactive to light.   Neck: No thyromegaly present.   Cardiovascular: Normal rate, regular rhythm, normal heart sounds and intact distal pulses.   No murmur heard.  Pulmonary/Chest: Effort normal and breath sounds normal. No respiratory distress. She has no wheezes. She has no rales.   Abdominal: Soft. Bowel sounds are normal. She exhibits no distension and no mass. There is no tenderness.   Genitourinary:   Genitourinary Comments: C/o abd cramps, currently menstuating   Musculoskeletal: Normal range of motion. She exhibits no edema or deformity.   Lymphadenopathy:     She has no cervical adenopathy.   Neurological: She is alert and oriented to person, place, and time.   CN II visual fields full to confrontation  CN III, Iv, VI- pupils ERRL   CN III- no palsy  Nystagmus; none   Diplopia- none  ophthalmoparesis- none  CN V- facial sensation intact  CN VII- facial expression full and symmetric  CNVIII- normal  CN IV-Normal  CN X- normal  CN XI- Normal   CN XII normal      Skin: Skin is warm and dry. No rash noted. No erythema.   Psychiatric:   Laying in bed sleeping; awakened easily, cooperative;    Nursing note and vitals reviewed.      Significant Labs:   UPT  Results for orders placed or performed during the hospital encounter of 03/16/18   POCT urine pregnancy   Result Value Ref Range    POC Preg Test, Ur Negative Negative     Acceptable Yes      U/A  Results for orders placed or performed during the hospital encounter of 03/16/18   Urinalysis - clean catch   Result Value Ref Range    Specimen UA Urine, Clean Catch     Color, UA Yellow Yellow, Straw, Layne    Appearance, UA Clear Clear    pH, UA 5.0 5.0 - 8.0    Specific Gravity, UA >1.030 (A) 1.005 - 1.030    Protein, UA Negative Negative    Glucose, UA Negative Negative    Ketones, UA Negative Negative    Bilirubin (UA) Negative Negative     Occult Blood UA Negative Negative    Nitrite, UA Negative Negative    Urobilinogen, UA Negative <2.0 EU/dL    Leukocytes, UA Negative Negative     UDS  Results for orders placed or performed during the hospital encounter of 12/03/18   Drug screen panel, emergency   Result Value Ref Range    Benzodiazepines Negative     Methadone metabolites Negative     Cocaine (Metab.) Negative     Opiate Scrn, Ur Negative     Barbiturate Screen, Ur Negative     Amphetamine Screen, Ur Negative     THC Negative     Phencyclidine Negative     Creatinine, Random Ur 195.1 15.0 - 325.0 mg/dL    Toxicology Information SEE COMMENT      CBC  Results for orders placed or performed during the hospital encounter of 12/03/18   CBC auto differential   Result Value Ref Range    WBC 11.78 3.90 - 12.70 K/uL    RBC 4.31 4.00 - 5.40 M/uL    Hemoglobin 13.2 12.0 - 16.0 g/dL    Hematocrit 40.3 37.0 - 48.5 %    MCV 94 82 - 98 fL    MCH 30.6 27.0 - 31.0 pg    MCHC 32.8 32.0 - 36.0 g/dL    RDW 13.1 11.5 - 14.5 %    Platelets 334 150 - 350 K/uL    MPV 9.7 9.2 - 12.9 fL    Gran # (ANC) 8.1 (H) 1.8 - 7.7 K/uL    Lymph # 2.6 1.0 - 4.8 K/uL    Mono # 0.9 0.3 - 1.0 K/uL    Eos # 0.1 0.0 - 0.5 K/uL    Baso # 0.04 0.00 - 0.20 K/uL    Gran% 69.1 38.0 - 73.0 %    Lymph% 22.2 18.0 - 48.0 %    Mono% 7.5 4.0 - 15.0 %    Eosinophil% 0.9 0.0 - 8.0 %    Basophil% 0.3 0.0 - 1.9 %    Differential Method Automated      CMP  Results for orders placed or performed during the hospital encounter of 12/03/18   Comprehensive metabolic panel   Result Value Ref Range    Sodium 139 136 - 145 mmol/L    Potassium 4.2 3.5 - 5.1 mmol/L    Chloride 105 95 - 110 mmol/L    CO2 23 23 - 29 mmol/L    Glucose 86 70 - 110 mg/dL    BUN, Bld 13 6 - 20 mg/dL    Creatinine 0.9 0.5 - 1.4 mg/dL    Calcium 8.9 8.7 - 10.5 mg/dL    Total Protein 7.5 6.0 - 8.4 g/dL    Albumin 3.8 3.5 - 5.2 g/dL    Total Bilirubin 0.2 0.1 - 1.0 mg/dL    Alkaline Phosphatase 94 55 - 135 U/L    AST 27 10 - 40 U/L    ALT  28 10 - 44 U/L    Anion Gap 11 8 - 16 mmol/L    eGFR if African American >60 >60 mL/min/1.73 m^2    eGFR if non African American >60 >60 mL/min/1.73 m^2     TSH  Results for orders placed or performed during the hospital encounter of 12/03/18   TSH   Result Value Ref Range    TSH 1.669 0.400 - 4.000 uIU/mL     ETOH  Results for orders placed or performed during the hospital encounter of 12/03/18   Ethanol   Result Value Ref Range    Alcohol, Medical, Serum <10 <10 mg/dL     Salicylate  Results for orders placed or performed during the hospital encounter of 12/03/18   Salicylate level   Result Value Ref Range    Salicylate Lvl <5.0 (L) 15.0 - 30.0 mg/dL     Acetaminophen  Results for orders placed or performed during the hospital encounter of 12/03/18   Acetaminophen level   Result Value Ref Range    Acetaminophen (Tylenol), Serum <3.0 (L) 10.0 - 20.0 ug/mL             Significant Imaging:none    Assessment/Plan:     Menstrual cramps    Ibuprofen 800mg prn ordered        Depression with suicidal ideation      Per psychiatry      Anxiety      Per psychiatry        VTE Risk Mitigation (From admission, onward)    None              Thank you for your consult. I will sign off. Please contact us if you have any additional questions.    Camille Paz, NP  Department of Hospital Medicine   [facility]

## 2018-12-04 NOTE — PLAN OF CARE
Problem: Patient Care Overview (Adult)  Goal: Plan of Care Review  Outcome: Ongoing (interventions implemented as appropriate)  Pt is calm and cooperative.  Denies any current S/I or H/I.  Pt does appear flat and depressed.  Has been in her room a lot today.  Encouraged pt to attend group during group times and to interact with others as she feels better.  Pt verbalized understanding.  No acute distress apparent at this time, will continue to monitor.

## 2018-12-05 PROCEDURE — 97802 MEDICAL NUTRITION INDIV IN: CPT

## 2018-12-05 PROCEDURE — 90833 PSYTX W PT W E/M 30 MIN: CPT | Mod: ,,, | Performed by: PSYCHIATRY & NEUROLOGY

## 2018-12-05 PROCEDURE — 25000003 PHARM REV CODE 250: Performed by: PSYCHIATRY & NEUROLOGY

## 2018-12-05 PROCEDURE — 99233 SBSQ HOSP IP/OBS HIGH 50: CPT | Mod: ,,, | Performed by: PSYCHIATRY & NEUROLOGY

## 2018-12-05 PROCEDURE — 11400000 HC PSYCH PRIVATE ROOM

## 2018-12-05 PROCEDURE — S4991 NICOTINE PATCH NONLEGEND: HCPCS | Performed by: PSYCHIATRY & NEUROLOGY

## 2018-12-05 RX ORDER — TRAZODONE HYDROCHLORIDE 150 MG/1
150 TABLET ORAL NIGHTLY
Status: DISCONTINUED | OUTPATIENT
Start: 2018-12-05 | End: 2018-12-06 | Stop reason: HOSPADM

## 2018-12-05 RX ADMIN — THERA TABS 1 TABLET: TAB at 08:12

## 2018-12-05 RX ADMIN — HYDROXYZINE PAMOATE 50 MG: 50 CAPSULE ORAL at 10:12

## 2018-12-05 RX ADMIN — HYDROXYZINE PAMOATE 100 MG: 50 CAPSULE ORAL at 08:12

## 2018-12-05 RX ADMIN — FOLIC ACID 1 MG: 1 TABLET ORAL at 08:12

## 2018-12-05 RX ADMIN — TRAZODONE HYDROCHLORIDE 150 MG: 150 TABLET ORAL at 08:12

## 2018-12-05 RX ADMIN — PRAZOSIN HYDROCHLORIDE 2 MG: 1 CAPSULE ORAL at 08:12

## 2018-12-05 RX ADMIN — IBUPROFEN 800 MG: 800 TABLET ORAL at 08:12

## 2018-12-05 RX ADMIN — NICOTINE 1 PATCH: 14 PATCH, EXTENDED RELEASE TRANSDERMAL at 09:12

## 2018-12-05 RX ADMIN — CARBAMAZEPINE 200 MG: 200 TABLET ORAL at 08:12

## 2018-12-05 RX ADMIN — IBUPROFEN 800 MG: 800 TABLET ORAL at 05:12

## 2018-12-05 RX ADMIN — LURASIDONE HYDROCHLORIDE 40 MG: 40 TABLET, FILM COATED ORAL at 04:12

## 2018-12-05 RX ADMIN — FLUOXETINE HYDROCHLORIDE 60 MG: 20 CAPSULE ORAL at 08:12

## 2018-12-05 NOTE — PLAN OF CARE
Problem: Patient Care Overview (Adult)  Goal: Plan of Care Review  Outcome: Ongoing (interventions implemented as appropriate)  Plan of care reviewed.  Denies intent to harm self or others at this time.  Accepts all meals and medications.  Gait steady, no falls. Quiet but will interact with staff and peers. Tonight had c/o menstual cramping and went to bed early after asking for prn motrin. Promoted an individualized safety plan, reality-based interactions, effective coping strategies, and impulse control.  Will continue to monitor for safety.         Problem: Feelings of Worthlessness, Hopelessness, Excessive Guilt (Depressive Signs/Symptoms) (Adult)  Intervention: Promote Confidence and Self-Esteem  Pt accepts meds as ordered by MD.

## 2018-12-05 NOTE — PLAN OF CARE
"Problem: Overarching Goals (Adult)  Goal: Develops/Participates in Therapeutic [place]helle to Support Successful Transition    Intervention: Mutually Develop Transition Plan  Collateral Contact:Spoke with patient's wife Keyla Caal [phone]. States she visited yesterday.  "She looks fantastic. She smiled more last night than in a long time.   A lot of it is based on her work, they treat her bad there.  She doesn't fit in. She works with a bunch of women and you know how that it. But she's going to fill out an application where my cousin works as soon as she gets out.   Wife confirms that she has removed guns from the house and they are are patient's fathers house secured.   Wife is a teacher, but states when patient is discharged she can arrange to get off early to pick her up.                "

## 2018-12-05 NOTE — PROGRESS NOTES
"PSYCHIATRY DAILY INPATIENT PROGRESS NOTE  SUBSEQUENT HOSPITAL VISIT    ENCOUNTER DATE: 12/5/2018  SITE: [facility]    DATE OF ADMISSION: 12/3/2018  5:25 PM  LENGTH OF STAY: 2 days      HISTORY    CHIEF COMPLAINT   Keyla Caal is a 28 y.o. female, seen during daily tang rounds on the inpatient unit.  Keyla Caal presents with the chief complaint of suicidal ideation    HPI   (Elements: Location, Quality, Severity, Duration, Timing, Content, Modifying Factors, Associated Signs & Symptoms)    The patient was seen and examined. The chart was reviewed.    Staff reports no behavioral or management issues.     The patient has been compliant with treatment. The patient denied any side effects.    Patient having self harm impulses but no longer suicidal.  We discussed different coping mechanisms that could help her feel better.  She fixated on the "release" that she gets from self harm.      Improving Symptoms of Depression: less diminished mood or loss of +interest/anhedonia; +irritability, +diminished energy, change in sleep, change in appetite, +diminished concentration or cognition or indecisiveness, PMA/R, +excessive guilt or hopelessness or worthlessness, resolved suicidal ideations     Improving Sleep: +initiation, +maintenance, early morning awakening with inability to return to sleep     Resolved Suicidal/Homicidal ideations: active/passive ideations, organized plans, future intentions     Continued Symptoms of ALEKSANDRA: all of the following excessive anxiety/worry/fear, more days than not, about numerous issues, difficult to control, with restlessness, fatigue, poor concentration, irritability, muscle tension, sleep disturbance; causes functionally impairing distress      Continued Symptoms of Panic Disorder: +recurrent panic attacks, precipitated or un-precipitated, +source of worry and/or behavioral changes secondary; +with or without agoraphobia     Continued Symptoms of PTSD: +h/o trauma; " "+re-experiencing/intrusive symptoms, +avoidant behavior, +negative alterations in cognition or mood, or hyperarousal symptoms; with or without dissociative symptoms       PSYCHOTHERAPY ADD-ON +[id number]   30 (16-37*) minutes    Time: 16 minutes  Participants: Met with patient    Therapeutic Intervention Type: behavior modifying psychotherapy, supportive psychotherapy  Why chosen therapy is appropriate versus another modality: relevant to diagnosis, patient responds to this modality, evidence based practice    Target symptoms: depression, anxiety   Primary focus: coping  Psychotherapeutic techniques: supportive psychoeducation    Outcome monitoring methods: self-report, observation    Patient's response to intervention:  The patient's response to intervention is accepting.    Progress toward goals:  The patient's progress toward goals is good.    Discussed calming the emotional storm book      ROS  Constitutional: Negative for chills and fever.   HENT: Negative for congestion and sore throat.    Respiratory: Negative for cough, chest tightness and shortness of breath.    Cardiovascular: Negative for chest pain, palpitations and leg swelling.   Gastrointestinal: Negative for abdominal pain, diarrhea, nausea and vomiting.   Genitourinary: Negative for flank pain, frequency and hematuria.   Musculoskeletal: Negative for back pain and neck pain.   Skin: Negative for rash and wound.   Neurological: Negative for dizziness, seizures, syncope and headaches.     PAST MEDICAL HISTORY   Past Medical History:   Diagnosis Date    Anxiety 2015    [facility]     Arthritis     [facility]ipolar disorder 2016    [facility]    Chronic knee pain     Depression 2016    '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, anger issues."     History of psychiatric hospitalization 2015    [facility] psychiatric care 2015    Buspar, Vistril, Xanax, Paxil    Elaine     Migraines 2014    Primary " "[facility] doctor at [facility]     Oppositional defiant disorder 1999    'I spent only one day at [facility] and was discharged."    Panic disorder 2015    [facility]    Psychiatric exam requested by authority 2015    [facility]    Psychiatric problem 2015    Depression and anxiety    Suicide attempt 2015    Interrupted Attempt    Therapy 2016    [facility]           PSYCHOTROPIC MEDICATIONS   Scheduled Meds:   carBAMazepine  200 mg Oral BID    FLUoxetine  60 mg Oral Daily    folic acid  1 mg Oral Daily    hydrOXYzine pamoate  100 mg Oral QHS    lurasidone  40 mg Oral Daily with dinner    multivitamin  1 tablet Oral Daily    nicotine  1 patch Transdermal Daily    prazosin  2 mg Oral QHS    traZODone  150 mg Oral QHS     Continuous Infusions:  PRN Meds:.acetaminophen, aluminum-magnesium hydroxide-simethicone, docusate sodium, hydrOXYzine pamoate, ibuprofen, loperamide, OLANZapine **AND** OLANZapine        EXAMINATION    VITALS   Vitals:    12/05/18 0748   BP: 115/63   Pulse: (!) 57   Resp: 16   Temp: 96.4 °F (35.8 °C)       CONSTITUTIONAL  General Appearance: ; NAD     MUSCULOSKELETAL  Muscle Strength and Tone:  normal  Abnormal Involuntary Movements:  none  Gait and Station:  normal; non-ataxic     PSYCHIATRIC   Level of Consciousness: awake, alert  Orientation: p/p/t/s  Grooming: in home clothes adequate to circumstances  Psychomotor Behavior: no PMA/R  Speech: nl r/t/v/s  Language: able to repeat words English fluent  Mood: "better"  Affect: mood congruent, euthymic  Thought Process:  linear and organized  Associations:  intact; no MARCY  Thought Content: no SI denied AVH/delusions; denied HI  Memory:  intact to recent and remote events  Attention:  intact to conversation; not distractible   Fund of Knowledge:  age and education appropriate  Estimate if Intelligence:  average based on work/education history, vocabulary and mental " status exam  Insight:  good- seeks help  Judgment:   good- no bx issues, compliant and cooperative      DIAGNOSTIC TESTING   Laboratory Results  No results found for this or any previous visit (from the past 24 hour(s)).      MEDICAL DECISION MAKING    DIAGNOSES  Major Depressive Disorder Severe Recurrent without psychosis  ALEKSANDRA  Panic with agoraphobia  PTSD  Borderline Personality Disorder    PROBLEM LIST AND MANAGEMENT PLANS    Depression - counseling, prozac, carbamazepine (offlabel), Latuda (offlabel with decrease / possible discontinuation), (discontinue remeron from home medicine), trazodone  Anxiety - counseling, prozac, stop xanax  PTSD  - counseling, prozac, prazosin  BPD - counseling, IHYD book and calming the emotional storm book    PRESCRIPTION DRUG MANAGEMENT  Compliance: yes  Side Effects: no  Regimen Adjustments:      12/4  Stop remeron xanax buspar  Carbamazepine 200mg BID  Prozac 60mg QDay (increase from 40)  Latuda 40mg with dinner (decrease from 80)  Prazosin 2mg QHS (increase from 1mg)  Trazodone 100mg QHS (increase from 50mg)          DISCHARGE PLANNING  -SW to assist with aftercare planning and collateral  -Once stable discharge home with outpatient follow up care and/or rehab  -Continue inpatient treatment under a [facility] and/or [facility] for danger to self, as evident by voiced suicidal ideation in the setting of depression anxiety ptsd and personality disorder          NEED FOR CONTINUED HOSPITALIZATION  Psychiatric illness continues to pose a potential threat to life or bodily function, of self or others, thereby requiring the need for continued inpatient psychiatric hospitalization: Yes    Protective inpatient pyschiatric hospitalization required while a safe disposition plan is enacted: Yes    Patient stabilized and ready for discharge from inpatient psychiatric unit: No      STAFF:   Isidoro Forte MD  Psychiatry

## 2018-12-05 NOTE — PLAN OF CARE
Problem: Patient Care Overview (Adult)  Goal: Plan of Care Review  Outcome: Ongoing (interventions implemented as appropriate)  Pt out on unit all shift.Pt reports adequate sleep last night.Appetite good.Pt denies any current suicidal thoughts.Pt requested and received Vistaril for anxiety which was helpful.Mood remains depressed.Medication compliant.

## 2018-12-05 NOTE — PLAN OF CARE
Problem: Overarching Goals (Adult)  Goal: Optimized Coping Skills in Response to Life Stressors    Intervention: Promote Effective Coping Strategies  Group Note    Behavior:  Patient attended group and participated. Patient presents with improved mood and affect.         Intervention:  Self Portrait - patient's juliette their self image, depicting how they felt about themselves. Patient's shared and described their drawing. Discussed the importance and benefits of having a positive self image vs. A negative self image. Patient also listed their positive qualities.         Response:  Patient juliette a picture of herself looking happy going home. Patient expecting to discharge on tomorrow.       Plan:  Will continue to encourage patient participation in group.

## 2018-12-05 NOTE — ASSESSMENT & PLAN NOTE
Nutrition Problem  Inadequate oral intake     Related to (etiology):   Pt not eating all meals      Signs and Symptoms (as evidenced by):   Pt PO intake at 25%     Interventions  General/healthful diet  Multivitamin/mineral supplement therapy     Recommendations (treatment strategy):  Recommend pt to continue on regular diet and taking multivitamins while in the hospital and at home. Continue to moniotor chlesterol/triglycerides and consider heart healthy options/education if these remain elevated.     Nutrition Diagnosis Status:   New
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
## 2018-12-06 VITALS
HEIGHT: 65 IN | RESPIRATION RATE: 18 BRPM | TEMPERATURE: 97 F | DIASTOLIC BLOOD PRESSURE: 75 MMHG | HEART RATE: 73 BPM | BODY MASS INDEX: 47.65 KG/M2 | SYSTOLIC BLOOD PRESSURE: 116 MMHG | WEIGHT: 286 LBS

## 2018-12-06 PROBLEM — F33.2 SEVERE EPISODE OF RECURRENT MAJOR DEPRESSIVE DISORDER, WITHOUT PSYCHOTIC FEATURES: Status: ACTIVE | Noted: 2018-12-03

## 2018-12-06 PROBLEM — F40.01 PANIC DISORDER WITH AGORAPHOBIA: Status: ACTIVE | Noted: 2018-09-14

## 2018-12-06 PROBLEM — F41.1 GAD (GENERALIZED ANXIETY DISORDER): Status: ACTIVE | Noted: 2018-12-06

## 2018-12-06 PROCEDURE — 99239 HOSP IP/OBS DSCHRG MGMT >30: CPT | Mod: ,,, | Performed by: PSYCHIATRY & NEUROLOGY

## 2018-12-06 PROCEDURE — S4991 NICOTINE PATCH NONLEGEND: HCPCS | Performed by: PSYCHIATRY & NEUROLOGY

## 2018-12-06 PROCEDURE — 25000003 PHARM REV CODE 250: Performed by: PSYCHIATRY & NEUROLOGY

## 2018-12-06 PROCEDURE — 90833 PSYTX W PT W E/M 30 MIN: CPT | Mod: ,,, | Performed by: PSYCHIATRY & NEUROLOGY

## 2018-12-06 RX ORDER — PRAZOSIN HYDROCHLORIDE 2 MG/1
2 CAPSULE ORAL NIGHTLY
Qty: 30 CAPSULE | Refills: 1 | Status: ON HOLD | OUTPATIENT
Start: 2018-12-06 | End: 2021-08-15 | Stop reason: HOSPADM

## 2018-12-06 RX ORDER — TRAZODONE HYDROCHLORIDE 100 MG/1
200 TABLET ORAL NIGHTLY
Qty: 60 TABLET | Refills: 1 | Status: SHIPPED | OUTPATIENT
Start: 2018-12-06 | End: 2019-02-04

## 2018-12-06 RX ORDER — MELOXICAM 15 MG/1
15 TABLET ORAL DAILY
Qty: 30 TABLET | Refills: 1 | Status: SHIPPED | OUTPATIENT
Start: 2018-12-06 | End: 2021-01-05 | Stop reason: CLARIF

## 2018-12-06 RX ORDER — FLUOXETINE HYDROCHLORIDE 20 MG/1
60 CAPSULE ORAL DAILY
Qty: 90 CAPSULE | Refills: 1 | Status: SHIPPED | OUTPATIENT
Start: 2018-12-07 | End: 2021-01-05

## 2018-12-06 RX ORDER — CARBAMAZEPINE 200 MG/1
200 TABLET ORAL 2 TIMES DAILY
Qty: 60 TABLET | Refills: 1 | Status: ON HOLD | OUTPATIENT
Start: 2018-12-06 | End: 2021-08-15 | Stop reason: HOSPADM

## 2018-12-06 RX ORDER — LURASIDONE HYDROCHLORIDE 40 MG/1
40 TABLET, FILM COATED ORAL
Qty: 30 TABLET | Refills: 1 | Status: SHIPPED | OUTPATIENT
Start: 2018-12-06 | End: 2019-02-04

## 2018-12-06 RX ADMIN — FLUOXETINE HYDROCHLORIDE 60 MG: 20 CAPSULE ORAL at 08:12

## 2018-12-06 RX ADMIN — FOLIC ACID 1 MG: 1 TABLET ORAL at 08:12

## 2018-12-06 RX ADMIN — THERA TABS 1 TABLET: TAB at 08:12

## 2018-12-06 RX ADMIN — IBUPROFEN 800 MG: 800 TABLET ORAL at 08:12

## 2018-12-06 RX ADMIN — NICOTINE 1 PATCH: 14 PATCH, EXTENDED RELEASE TRANSDERMAL at 08:12

## 2018-12-06 RX ADMIN — CARBAMAZEPINE 200 MG: 200 TABLET ORAL at 08:12

## 2018-12-06 NOTE — PLAN OF CARE
Problem: Patient Care Overview (Adult)  Goal: Plan of Care Review  Outcome: Ongoing (interventions implemented as appropriate)  Pt is resting in bed at this time and has slept 7 hours uninterrupted.  NAD.  Resp even & unlabored.  Pathways clear and bed in low position.  Q 15 minute safety checks ongoing.  All precautions maintained

## 2018-12-06 NOTE — PROGRESS NOTES
PSYCHOTHERAPY ADD-ON [id number]   30 (16-37*) minutes    Site: [facility]  Time: 16 minutes  Participants: Met with patient    Therapeutic Intervention Type: behavior modifying psychotherapy, supportive psychotherapy  Why chosen therapy is appropriate versus another modality: relevant to diagnosis    Target symptoms: depression, anxiety   Primary focus: safety, coping  Psychotherapeutic techniques: supportive psychoeducation dbt    Outcome monitoring methods: self-report, observation    Patient's response to intervention:  The patient's response to intervention is accepting.    Progress toward goals:  The patient's progress toward goals is good.    We discussed a safety plan, coping skills, and tenants of DBT.  Patient will work on finding alternative ways to cope than self harm.  She feels that she is not bipolar and will work on reducing medications further.  We discussed harms of xanax use long term.  Patient seemed satisfied with care received here.

## 2018-12-06 NOTE — NURSING
Pt left floor with wife to go home with staff escorted to front door. Belongings in hand. No signs and symptoms of distress.

## 2018-12-06 NOTE — DISCHARGE SUMMARY
"Discharge Summary  Psychiatry    Admit Date: 12/3/2018    Discharge Date and Time:  12/06/2018 10:39 AM    Attending Physician: Tao Elizabeth MD     Discharge Provider: Isidoro Forte    Reason for Admission:  Suicidal ideation    History of Present Illness:   Current Medication  Xanax 1mg QDay PRN using about a couple per week  Buspar 10mg TID  Tegretol 200mg BID  Prozac 20mg BID  Vistaril 50mg Qday PRN anxiety  Latuda 80mg QHS with food ("I think it sucks, worked for the most part and once they increased it corby been very anxious")  Remeron 30mg QHS  Prazosin 1mg QHS  Trazodone 50mg QHS     Past Medication  Lithium  Caused fluid retention     Borderline Personality Disorder Screen     Efforts to avoid real or imagined abandonment, Pattern of intense and unstable relationships, Impulsive and often dangerous behaviors, Self harming, such as cutting, Recurring thoughts of suicidal behaviors or threats, Intense and highly changeable moods, Chronic feelings of emptiness, Inappropriate, intense anger or problems controlling anger, Difficulty trusting, fear of others intentions and Feelings of dissociation     Patient discussed suicidal ideation with her provider with plan to cut neck.  She was found to have superficial cuts on her neck which were from last week.  She endorses chronic suicidal ideations that are commonly seen with bpd.  She has psychosocial stress related to her significant other.  She does not go into detail regarding her stress and is somewhat guarded.  We discussed BPD and her medication regimen in detail.  Will hope to obtain more details regarding her stress as rapport is built.       Endorses Symptoms of Depression: +diminished mood or loss of +interest/anhedonia; +irritability, +diminished energy, change in sleep, change in appetite, +diminished concentration or cognition or indecisiveness, PMA/R, +excessive guilt or hopelessness or worthlessness, +suicidal ideations     Endorses Sleep: " +initiation, +maintenance, early morning awakening with inability to return to sleep     Endorses Suicidal/Homicidal ideations: active/+passive ideations, organized plans, future intentions     Denies Symptoms of psychosis: hallucinations, delusions, disorganized thinking, disorganized behavior or abnormal motor behavior, or negative symptoms (diminshed emotional expression, avolition, anhedonia, alogia, asociality      Denies Symptoms of ezio or hypomania: elevated, expansive, or irritable mood with increased energy or activity; with inflated self-esteem or grandiosity, decreased need for sleep, increased rate of speech, FOI or racing thoughts, distractibility, increased goal directed activity or PMA, risky/disinhibited behavior     Endorses Symptoms of ALEKSANDRA: all of the following excessive anxiety/worry/fear, more days than not, about numerous issues, difficult to control, with restlessness, fatigue, poor concentration, irritability, muscle tension, sleep disturbance; causes functionally impairing distress      Endorses Symptoms of Panic Disorder: +recurrent panic attacks, precipitated or un-precipitated, +source of worry and/or behavioral changes secondary; +with or without agoraphobia     Endorses Symptoms of PTSD: +h/o trauma; +re-experiencing/intrusive symptoms, +avoidant behavior, +negative alterations in cognition or mood, or hyperarousal symptoms; with or without dissociative symptoms      Has nightmares related to trauma, still getting 3 dreams per week     Symptoms of OCD: obsessions or compulsions      Symptoms of Eating Disorders: anorexia, bulimia or binging     Substance Use: intoxication, withdrawal, tolerance, used in larger amounts or duration than intended, unsuccessful attempts to limit or quit, increased time engaging in or seeking out, cravings or strong desire to use, failure to fulfill obligations, negative consequences in social/interpersonal/occupational,/recreational areas, use in dangerous  situations, medical or psychological consequences         Procedures Performed: * No surgery found *    Hospital Course (synopsis of major diagnoses, care, treatment, and services provided during the course of the hospital stay):   The patient was stabilized and discharged on the following medications:  Carbamazepine 200mg BID offlabel for depression  Prozac 60mg qday for depression  Latuda 40mg with dinner offlabel for depression  Prazosin 2mg QHS for ptsd  Trazodone 200mg for major depression    The patient was compliant with treatment. The patient denied any side effects.     Improving Symptoms of Depression: less diminished mood or loss of +interest/anhedonia; +irritability, +diminished energy, change in sleep, change in appetite, +diminished concentration or cognition or indecisiveness, PMA/R, +excessive guilt or hopelessness or worthlessness, resolved suicidal ideations     Improving Sleep: +initiation, +maintenance, early morning awakening with inability to return to sleep     Resolved Suicidal/Homicidal ideations: active/passive ideations, organized plans, future intentions     Continued Symptoms of ALEKSANDRA: all of the following excessive anxiety/worry/fear, more days than not, about numerous issues, difficult to control, with restlessness, fatigue, poor concentration, irritability, muscle tension, sleep disturbance; causes functionally impairing distress      Continued Symptoms of Panic Disorder: +recurrent panic attacks, precipitated or un-precipitated, +source of worry and/or behavioral changes secondary; +with or without agoraphobia     Continued Symptoms of PTSD: +h/o trauma; +re-experiencing/intrusive symptoms, +avoidant behavior, +negative alterations in cognition or mood, or hyperarousal symptoms; with or without dissociative symptoms     Discussed diagnosis, risks and benefits of proposed treatment vs alternative treatments vs no treatment, and potential side effects of these treatments.  The patient  expresses understanding of the above and displays the capacity to agree with this treatment given said understanding.  Patient also agrees that, currently, the benefits outweigh the risks and would like to pursue treatment at this time.    MSE: stated age, casually dressed, well groomed.  No psychomotor agitation or retardation.  No abnormal involuntary movements.  Gait normal.  Speech normal, conversational.  Language fluent English. Mood fine.  Affect normal range, pleasant, euthymic.  Thought process linear.  Associations intact.  Denies suicidal or homicidal ideation.  Denies auditory hallucinations, paranoid ideation, ideas of reference.  Memory intact.  Attention intact.  Fund of knowledge intact.  Insight intact.  Judgment intact.  Alert and oriented to person, place, time.      Tobacco Usage:  Is patient a smoker? Yes  Does patient want prescription for Tobacco Cessation? No  Does patient want counseling for Tobacco Cessation? No    If patient would like to quit, then over the counter nicotine patch could be used. The patient could also follow up with his PCP or psychiatric provider for other alternatives.     Final Diagnoses:    Principal Problem:   Major Depressive Disorder Severe Recurrent without psychosis       Secondary Diagnoses:   ALEKSANDRA  Panic with agoraphobia  PTSD  Borderline Personality Disorder        Labs:  Admission on 12/03/2018   Component Date Value Ref Range Status    Cholesterol 12/03/2018 217* 120 - 199 mg/dL Final    Triglycerides 12/03/2018 242* 30 - 150 mg/dL Final    HDL 12/03/2018 42  40 - 75 mg/dL Final    LDL Cholesterol 12/03/2018 126.6  63.0 - 159.0 mg/dL Final    HDL/Chol Ratio 12/03/2018 19.4* 20.0 - 50.0 % Final    Total Cholesterol/HDL Ratio 12/03/2018 5.2* 2.0 - 5.0 Final    Non-HDL Cholesterol 12/03/2018 175  mg/dL Final    Hemoglobin A1C 12/03/2018 5.2  4.0 - 5.6 % Final    Estimated Avg Glucose 12/03/2018 103  68 - 131 mg/dL Final   Admission on 12/03/2018,  Discharged on 12/03/2018   Component Date Value Ref Range Status    Sodium 12/03/2018 139  136 - 145 mmol/L Final    Potassium 12/03/2018 4.2  3.5 - 5.1 mmol/L Final    Chloride 12/03/2018 105  95 - 110 mmol/L Final    CO2 12/03/2018 23  23 - 29 mmol/L Final    Glucose 12/03/2018 86  70 - 110 mg/dL Final    BUN, Bld 12/03/2018 13  6 - 20 mg/dL Final    Creatinine 12/03/2018 0.9  0.5 - 1.4 mg/dL Final    Calcium 12/03/2018 8.9  8.7 - 10.5 mg/dL Final    Total Protein 12/03/2018 7.5  6.0 - 8.4 g/dL Final    Albumin 12/03/2018 3.8  3.5 - 5.2 g/dL Final    Total Bilirubin 12/03/2018 0.2  0.1 - 1.0 mg/dL Final    Alkaline Phosphatase 12/03/2018 94  55 - 135 U/L Final    AST 12/03/2018 27  10 - 40 U/L Final    ALT 12/03/2018 28  10 - 44 U/L Final    Anion Gap 12/03/2018 11  8 - 16 mmol/L Final    eGFR if African American 12/03/2018 >60  >60 mL/min/1.73 m^2 Final    eGFR if non African American 12/03/2018 >60  >60 mL/min/1.73 m^2 Final    WBC 12/03/2018 11.78  3.90 - 12.70 K/uL Final    RBC 12/03/2018 4.31  4.00 - 5.40 M/uL Final    Hemoglobin 12/03/2018 13.2  12.0 - 16.0 g/dL Final    Hematocrit 12/03/2018 40.3  37.0 - 48.5 % Final    MCV 12/03/2018 94  82 - 98 fL Final    MCH 12/03/2018 30.6  27.0 - 31.0 pg Final    MCHC 12/03/2018 32.8  32.0 - 36.0 g/dL Final    RDW 12/03/2018 13.1  11.5 - 14.5 % Final    Platelets 12/03/2018 334  150 - 350 K/uL Final    MPV 12/03/2018 9.7  9.2 - 12.9 fL Final    Gran # (ANC) 12/03/2018 8.1* 1.8 - 7.7 K/uL Final    Lymph # 12/03/2018 2.6  1.0 - 4.8 K/uL Final    Mono # 12/03/2018 0.9  0.3 - 1.0 K/uL Final    Eos # 12/03/2018 0.1  0.0 - 0.5 K/uL Final    Baso # 12/03/2018 0.04  0.00 - 0.20 K/uL Final    Gran% 12/03/2018 69.1  38.0 - 73.0 % Final    Lymph% 12/03/2018 22.2  18.0 - 48.0 % Final    Mono% 12/03/2018 7.5  4.0 - 15.0 % Final    Eosinophil% 12/03/2018 0.9  0.0 - 8.0 % Final    Basophil% 12/03/2018 0.3  0.0 - 1.9 % Final    Differential  Method 12/03/2018 Automated   Final    Acetaminophen (Tylenol), Serum 12/03/2018 <3.0* 10.0 - 20.0 ug/mL Final    Salicylate Lvl 12/03/2018 <5.0* 15.0 - 30.0 mg/dL Final    Specimen UA 12/03/2018 Urine, Clean Catch   Final    Color, UA 12/03/2018 Yellow  Yellow, Straw, Layne Final    Appearance, UA 12/03/2018 Clear  Clear Final    pH, UA 12/03/2018 7.0  5.0 - 8.0 Final    Specific Gravity, UA 12/03/2018 1.025  1.005 - 1.030 Final    Protein, UA 12/03/2018 1+* Negative Final    Glucose, UA 12/03/2018 Negative  Negative Final    Ketones, UA 12/03/2018 Negative  Negative Final    Bilirubin (UA) 12/03/2018 Negative  Negative Final    Occult Blood UA 12/03/2018 3+* Negative Final    Nitrite, UA 12/03/2018 Negative  Negative Final    Urobilinogen, UA 12/03/2018 Negative  <2.0 EU/dL Final    Leukocytes, UA 12/03/2018 Negative  Negative Final    Benzodiazepines 12/03/2018 Negative   Final    Methadone metabolites 12/03/2018 Negative   Final    Cocaine (Metab.) 12/03/2018 Negative   Final    Opiate Scrn, Ur 12/03/2018 Negative   Final    Barbiturate Screen, Ur 12/03/2018 Negative   Final    Amphetamine Screen, Ur 12/03/2018 Negative   Final    THC 12/03/2018 Negative   Final    Phencyclidine 12/03/2018 Negative   Final    Creatinine, Random Ur 12/03/2018 195.1  15.0 - 325.0 mg/dL Final    Toxicology Information 12/03/2018 SEE COMMENT   Final    TSH 12/03/2018 1.669  0.400 - 4.000 uIU/mL Final    Free T4 12/03/2018 0.83  0.71 - 1.51 ng/dL Final    T3, Free 12/03/2018 2.7  2.3 - 4.2 pg/mL Final    Vitamin B-12 12/03/2018 290  210 - 950 pg/mL Final    Folate 12/03/2018 8.7  4.0 - 24.0 ng/mL Final    Vit D, 25-Hydroxy 12/03/2018 22* 30 - 96 ng/mL Final    Alcohol, Medical, Serum 12/03/2018 <10  <10 mg/dL Final    RBC, UA 12/03/2018 20* 0 - 4 /hpf Final    WBC, UA 12/03/2018 1  0 - 5 /hpf Final    Bacteria, UA 12/03/2018 Occasional  None-Occ /hpf Final    Hyaline Casts, UA 12/03/2018 0  0-1/lpf  /lpf Final    Microscopic Comment 12/03/2018 SEE COMMENT   Final         Discharged Condition: stable and improved; not currently a danger to self/others or gravely disabled    Disposition: Home or Self Care    Is patient being discharged on multiple neuroleptics? No    Follow Up/Patient Instructions:     Medications:  Reconciled Home Medications:      Medication List      START taking these medications    meloxicam 15 MG tablet  Commonly known as:  MOBIC  Take 1 tablet (15 mg total) by mouth once daily.        CHANGE how you take these medications    FLUoxetine 20 MG capsule  Take 3 capsules (60 mg total) by mouth once daily.  Start taking on:  12/7/2018  What changed:    · how much to take  · when to take this     lurasidone 40 mg Tab tablet  Commonly known as:  LATUDA  Take 1 tablet (40 mg total) by mouth daily with dinner or evening meal.  What changed:    · how much to take  · when to take this     prazosin 2 MG Cap  Commonly known as:  MINIPRESS  Take 1 capsule (2 mg total) by mouth every evening.  What changed:    · medication strength  · how much to take     traZODone 100 MG tablet  Commonly known as:  DESYREL  Take 2 tablets (200 mg total) by mouth every evening.  What changed:    · medication strength  · how much to take        CONTINUE taking these medications    carBAMazepine 200 mg tablet  Commonly known as:  TEGRETOL  Take 1 tablet (200 mg total) by mouth 2 (two) times daily.     hydrOXYzine pamoate 50 MG Cap  Commonly known as:  VISTARIL  Take 1 capsule (50 mg total) by mouth every 8 (eight) hours as needed (first choice).        STOP taking these medications    ALPRAZolam 1 MG tablet  Commonly known as:  XANAX     busPIRone 10 MG tablet  Commonly known as:  BUSPAR     diclofenac 50 MG EC tablet  Commonly known as:  VOLTAREN     furosemide 20 MG tablet  Commonly known as:  LASIX     mirtazapine 30 MG tablet  Commonly known as:  REMERON          Discharge Procedure Orders   Diet Adult Regular      Notify your health care provider if you experience any of the following:   Order Comments: Suicidal ideation     Activity as tolerated     Follow-up Information     [facility]. Go on 12/10/2018.    Specialties:  Psychology, Behavioral Health  Why:  Outpatient Psych Services, as scheduled for 10:30am  Contact information:  [address] [address]  [place] [address]  [phone]                     Diet: regular     Activity as tolerated    Total time spent discharging patient: 32 minutes    Isidoro Forte MD  Psychiatry

## 2018-12-06 NOTE — PSYCH
The patient will follow up with [facility], [address] [address], [place], [place]. [phone].  The appointment is scheduled for 12/10/2018 at 10:30a.m. The patient will receive tobacco cessation appointment. The . AVS was faxed at 11:15am on 10/06/2018.

## 2018-12-06 NOTE — PLAN OF CARE
Problem: Patient Care Overview (Adult)  Goal: Plan of Care Review  Outcome: Ongoing (interventions implemented as appropriate)  Up and about the unit.  Spent the evening in the dayroom watching TV.  Calm, cooperative.  Interacting appropriately with peers and staff.  Compliant with/tolerating meds.  Denies SI.  Remains depressed.  Participating in unit activities.  All precautions maintained.

## 2018-12-06 NOTE — PSYCH
Order for discharge received.Discharge instructions explained to pt and voiced a understanding.Calm,cooperative,no si/hi,or agitation.Pt will provide a ride home.

## 2018-12-10 ENCOUNTER — PATIENT OUTREACH (OUTPATIENT)
Dept: ADMINISTRATIVE | Facility: CLINIC | Age: 28
End: 2018-12-10

## 2018-12-10 NOTE — PATIENT INSTRUCTIONS
"Know the Signs and Symptoms of Depression  Everyone feels down at times. The blues are a natural part of life. But an unhappy period thats intense or lasts for more than a couple of weeks can be a sign of depression.  Depression is a serious illness. It is not a sign of weakness or a "character flaw," and it is not something you can "snap out of." In fact, most people with depression need treatment to get better. Depression can disrupt the lives of family and friends. If you know someone you think may be depressed, find out what you can do to help.    Recognizing signs of depression  People who are depressed may:  · Feel unhappy, sad, blue, down, or miserable nearly every day  · Feel helpless, hopeless, or worthless  · Lose interest in hobbies, friends, and activities that used to give pleasure  · Not sleep well or sleep too much  · Gain or lose weight  · Feel low on energy or constantly tired  · Have a hard time concentrating or making decisions  · Lose interest in sex  · Have physical symptoms, such as stomachaches, headaches, or backaches  Know the serious signals  Never ignore a person's comments about suicide or behaviors that can lead to self-harm. Warning signals for suicide include:  · Threats or talk of suicide  · Statements such as I wont be a problem much longer or Nothing matters  · Giving away possessions or making a will or  arrangements  · Buying a gun or other weapon  · Sudden, unexplained cheerfulness or calm after a period of depression  If you notice any of these signs, get help right away. Call a healthcare professional, mental health clinic, or suicide hotline and ask what action to take. In an emergency, dont hesitate to call the police.  Resources:  · National Institutes of Mental Aigvsq983-592-7586vtz.[facility] Hospitalh.nih.gov  · National [place] on Mental Clyvlhi268-951-7412vlb.susie.org   · Mental Health Jaulint008-291-1306deb.nmha.org  · National Suicide Djlxpfv231-647-3052 " (800-SUICIDE)  · National Suicide Prevention Ibyjgovz072-379-5833 (580-898-PWOK)www.suicidepreventionlifeline.org   Date Last Reviewed: 1/1/2017  © 4017-1666 [employer] [address] [place] [address], [place] [address]. All rights reserved. This information is not intended as a substitute for professional medical care. Always follow your healthcare professional's instructions.

## 2018-12-10 NOTE — PROGRESS NOTES
[phone] [facility]  [phone]   C3 nurse attempted to contact patient. No answer. The following message was left for the patient to return the call:  Good afternoon  I am a nurse calling on behalf of [employer] from the [facility].  This is a Transitional Care Call for Keyla Caal. When you have a moment please contact us at [phone] or [phone] Monday through Friday, between the hours of 8 am to 4 pm. We look forward to speaking with you. On behalf of [facility] have a nice day.    The patient has a scheduled Mental Health appointment with [facility] on 12/10/18 @ 1030 hrs.

## 2021-05-04 ENCOUNTER — PATIENT MESSAGE (OUTPATIENT)
Dept: RESEARCH | Facility: HOSPITAL | Age: 31
End: 2021-05-04

## 2021-08-15 PROBLEM — R45.851 SUICIDAL IDEATION: Status: RESOLVED | Noted: 2018-03-17 | Resolved: 2021-08-15

## 2021-08-15 PROBLEM — F40.01 PANIC DISORDER WITH AGORAPHOBIA: Status: RESOLVED | Noted: 2018-09-14 | Resolved: 2021-08-15

## 2021-08-15 PROBLEM — F31.62 MIXED BIPOLAR AFFECTIVE DISORDER, MODERATE: Status: RESOLVED | Noted: 2018-09-14 | Resolved: 2021-08-15

## 2021-09-14 PROBLEM — Z72.0 TOBACCO USER: Status: ACTIVE | Noted: 2021-09-14

## 2021-10-01 PROBLEM — Z72.89 SELF-INJURIOUS BEHAVIOR: Status: RESOLVED | Noted: 2021-08-06 | Resolved: 2021-08-15

## 2021-10-12 PROBLEM — R06.02 SOB (SHORTNESS OF BREATH): Status: ACTIVE | Noted: 2021-10-12

## 2022-01-20 ENCOUNTER — PATIENT OUTREACH (OUTPATIENT)
Dept: ADMINISTRATIVE | Facility: HOSPITAL | Age: 32
End: 2022-01-20

## 2022-01-24 ENCOUNTER — PATIENT MESSAGE (OUTPATIENT)
Dept: ADMINISTRATIVE | Facility: HOSPITAL | Age: 32
End: 2022-01-24

## 2022-02-03 PROBLEM — K80.50 BILIARY COLIC: Status: ACTIVE | Noted: 2022-02-03

## 2022-04-04 ENCOUNTER — PATIENT MESSAGE (OUTPATIENT)
Dept: ADMINISTRATIVE | Facility: HOSPITAL | Age: 32
End: 2022-04-04

## 2022-05-06 ENCOUNTER — PATIENT OUTREACH (OUTPATIENT)
Dept: ADMINISTRATIVE | Facility: HOSPITAL | Age: 32
End: 2022-05-06

## 2022-07-11 ENCOUNTER — PATIENT MESSAGE (OUTPATIENT)
Dept: ADMINISTRATIVE | Facility: HOSPITAL | Age: 32
End: 2022-07-11

## 2022-10-03 ENCOUNTER — PATIENT MESSAGE (OUTPATIENT)
Dept: ADMINISTRATIVE | Facility: HOSPITAL | Age: 32
End: 2022-10-03

## 2023-01-09 ENCOUNTER — PATIENT MESSAGE (OUTPATIENT)
Dept: ADMINISTRATIVE | Facility: HOSPITAL | Age: 33
End: 2023-01-09

## 2023-04-21 PROBLEM — J18.9 PNEUMONIA OF RIGHT LOWER LOBE DUE TO INFECTIOUS ORGANISM: Status: ACTIVE | Noted: 2023-04-21

## 2023-04-24 PROBLEM — J96.01 ACUTE HYPOXEMIC RESPIRATORY FAILURE: Status: ACTIVE | Noted: 2023-04-24

## 2023-04-25 PROBLEM — J96.02 ACUTE RESPIRATORY FAILURE WITH HYPOXIA AND HYPERCAPNIA: Status: ACTIVE | Noted: 2023-04-24

## 2023-04-25 PROBLEM — J96.01 ACUTE RESPIRATORY FAILURE WITH HYPOXIA: Status: ACTIVE | Noted: 2023-04-25

## 2023-04-25 PROBLEM — J96.01 ACUTE RESPIRATORY FAILURE WITH HYPOXIA: Status: RESOLVED | Noted: 2023-04-25 | Resolved: 2023-04-25

## 2023-04-25 PROBLEM — J18.9 COMMUNITY ACQUIRED PNEUMONIA: Status: ACTIVE | Noted: 2023-04-25

## 2023-04-25 PROBLEM — J18.9 PNEUMONIA OF BOTH LUNGS DUE TO INFECTIOUS ORGANISM: Status: RESOLVED | Noted: 2023-04-21 | Resolved: 2023-04-25

## 2023-04-25 PROBLEM — N17.9 AKI (ACUTE KIDNEY INJURY): Status: ACTIVE | Noted: 2023-04-25

## 2023-04-25 PROBLEM — A41.9 SEPSIS: Status: ACTIVE | Noted: 2023-04-25

## 2023-04-29 PROBLEM — J12.9 VIRAL PNEUMONIA: Status: ACTIVE | Noted: 2023-04-25

## 2023-04-29 PROBLEM — A41.9 SEPSIS: Status: RESOLVED | Noted: 2023-04-25 | Resolved: 2023-04-29

## 2023-05-02 ENCOUNTER — PATIENT OUTREACH (OUTPATIENT)
Dept: ADMINISTRATIVE | Facility: CLINIC | Age: 33
End: 2023-05-02

## 2023-05-02 NOTE — PROGRESS NOTES
C3 nurse spoke with Keyla Caal for a TCC post hospital discharge follow up call. The patient has a scheduled HOSFU appointment with Eric Rangel MD on 5/5/23 @ 7681.

## 2023-06-29 PROBLEM — E66.01 MORBID OBESITY: Status: ACTIVE | Noted: 2023-06-29

## 2023-06-29 PROBLEM — R11.14 BILIOUS VOMITING WITH NAUSEA: Status: ACTIVE | Noted: 2023-06-29

## 2023-06-29 PROBLEM — N17.9 AKI (ACUTE KIDNEY INJURY): Status: RESOLVED | Noted: 2023-04-25 | Resolved: 2023-06-29

## 2023-06-29 PROBLEM — R73.02 IGT (IMPAIRED GLUCOSE TOLERANCE): Status: ACTIVE | Noted: 2023-06-29

## 2023-06-29 PROBLEM — E78.2 MIXED HYPERLIPIDEMIA: Status: ACTIVE | Noted: 2023-06-29

## 2023-06-29 PROBLEM — K76.0 FATTY LIVER: Status: ACTIVE | Noted: 2023-06-29

## 2023-06-29 PROBLEM — J12.9 VIRAL PNEUMONIA: Status: RESOLVED | Noted: 2023-04-25 | Resolved: 2023-06-29

## 2023-06-29 PROBLEM — E78.1 HYPERTRIGLYCERIDEMIA: Status: ACTIVE | Noted: 2023-06-29

## 2023-06-29 PROBLEM — J96.01 ACUTE RESPIRATORY FAILURE WITH HYPOXIA AND HYPERCAPNIA: Status: RESOLVED | Noted: 2023-04-24 | Resolved: 2023-06-29

## 2023-06-29 PROBLEM — J96.02 ACUTE RESPIRATORY FAILURE WITH HYPOXIA AND HYPERCAPNIA: Status: RESOLVED | Noted: 2023-04-24 | Resolved: 2023-06-29

## 2024-02-26 ENCOUNTER — HOSPITAL ENCOUNTER (INPATIENT)
Facility: HOSPITAL | Age: 34
LOS: 3 days | Discharge: HOME | DRG: 885 | End: 2024-02-29
Attending: STUDENT IN AN ORGANIZED HEALTH CARE EDUCATION/TRAINING PROGRAM | Admitting: PSYCHIATRY & NEUROLOGY
Payer: COMMERCIAL

## 2024-02-26 ENCOUNTER — HOSPITAL ENCOUNTER (EMERGENCY)
Facility: HOSPITAL | Age: 34
Discharge: PSYCHIATRIC OR SUBSTANCE USE FACILITY | End: 2024-02-26
Attending: STUDENT IN AN ORGANIZED HEALTH CARE EDUCATION/TRAINING PROGRAM
Payer: MEDICARE

## 2024-02-26 VITALS
BODY MASS INDEX: 41.35 KG/M2 | OXYGEN SATURATION: 98 % | RESPIRATION RATE: 16 BRPM | WEIGHT: 248.44 LBS | TEMPERATURE: 98 F | SYSTOLIC BLOOD PRESSURE: 144 MMHG | DIASTOLIC BLOOD PRESSURE: 73 MMHG | HEART RATE: 64 BPM

## 2024-02-26 DIAGNOSIS — F32.A DEPRESSION WITH SUICIDAL IDEATION: Primary | ICD-10-CM

## 2024-02-26 DIAGNOSIS — F32.A DEPRESSION, UNSPECIFIED DEPRESSION TYPE: Primary | ICD-10-CM

## 2024-02-26 DIAGNOSIS — R45.851 DEPRESSION WITH SUICIDAL IDEATION: Primary | ICD-10-CM

## 2024-02-26 LAB
ALBUMIN SERPL BCP-MCNC: 3.7 G/DL (ref 3.5–5.2)
ALP SERPL-CCNC: 59 U/L (ref 55–135)
ALT SERPL W/O P-5'-P-CCNC: 15 U/L (ref 10–44)
AMPHET+METHAMPHET UR QL: NEGATIVE
ANION GAP SERPL CALC-SCNC: 7 MMOL/L (ref 8–16)
APAP SERPL-MCNC: <3 UG/ML (ref 10–20)
AST SERPL-CCNC: 14 U/L (ref 10–40)
B-HCG UR QL: NEGATIVE
BARBITURATES UR QL SCN>200 NG/ML: NEGATIVE
BASOPHILS # BLD AUTO: 0.04 K/UL (ref 0–0.2)
BASOPHILS NFR BLD: 0.3 % (ref 0–1.9)
BENZODIAZ UR QL SCN>200 NG/ML: ABNORMAL
BILIRUB SERPL-MCNC: 0.2 MG/DL (ref 0.1–1)
BILIRUB UR QL STRIP: NEGATIVE
BUN SERPL-MCNC: 16 MG/DL (ref 6–20)
BZE UR QL SCN: NEGATIVE
CALCIUM SERPL-MCNC: 9.3 MG/DL (ref 8.7–10.5)
CANNABINOIDS UR QL SCN: ABNORMAL
CHLORIDE SERPL-SCNC: 103 MMOL/L (ref 95–110)
CLARITY UR: CLEAR
CO2 SERPL-SCNC: 28 MMOL/L (ref 23–29)
COLOR UR: YELLOW
CREAT SERPL-MCNC: 0.8 MG/DL (ref 0.5–1.4)
CREAT UR-MCNC: 29.2 MG/DL (ref 15–325)
DIFFERENTIAL METHOD BLD: NORMAL
EOSINOPHIL # BLD AUTO: 0.2 K/UL (ref 0–0.5)
EOSINOPHIL NFR BLD: 1.3 % (ref 0–8)
ERYTHROCYTE [DISTWIDTH] IN BLOOD BY AUTOMATED COUNT: 12.9 % (ref 11.5–14.5)
EST. GFR  (NO RACE VARIABLE): >60 ML/MIN/1.73 M^2
ETHANOL SERPL-MCNC: <10 MG/DL
GLUCOSE SERPL-MCNC: 68 MG/DL (ref 70–110)
GLUCOSE UR QL STRIP: NEGATIVE
HCT VFR BLD AUTO: 43.2 % (ref 37–48.5)
HGB BLD-MCNC: 14.1 G/DL (ref 12–16)
HGB UR QL STRIP: NEGATIVE
IMM GRANULOCYTES # BLD AUTO: 0.03 K/UL (ref 0–0.04)
IMM GRANULOCYTES NFR BLD AUTO: 0.3 % (ref 0–0.5)
KETONES UR QL STRIP: NEGATIVE
LEUKOCYTE ESTERASE UR QL STRIP: NEGATIVE
LYMPHOCYTES # BLD AUTO: 3.1 K/UL (ref 1–4.8)
LYMPHOCYTES NFR BLD: 26.4 % (ref 18–48)
MCH RBC QN AUTO: 29.9 PG (ref 27–31)
MCHC RBC AUTO-ENTMCNC: 32.6 G/DL (ref 32–36)
MCV RBC AUTO: 92 FL (ref 82–98)
METHADONE UR QL SCN>300 NG/ML: NEGATIVE
MONOCYTES # BLD AUTO: 0.7 K/UL (ref 0.3–1)
MONOCYTES NFR BLD: 6.3 % (ref 4–15)
NEUTROPHILS # BLD AUTO: 7.6 K/UL (ref 1.8–7.7)
NEUTROPHILS NFR BLD: 65.4 % (ref 38–73)
NITRITE UR QL STRIP: NEGATIVE
NRBC BLD-RTO: 0 /100 WBC
OPIATES UR QL SCN: NEGATIVE
PCP UR QL SCN>25 NG/ML: NEGATIVE
PH UR STRIP: 6 [PH] (ref 5–8)
PLATELET # BLD AUTO: 376 K/UL (ref 150–450)
PMV BLD AUTO: 10.4 FL (ref 9.2–12.9)
POTASSIUM SERPL-SCNC: 4 MMOL/L (ref 3.5–5.1)
PROT SERPL-MCNC: 7.3 G/DL (ref 6–8.4)
PROT UR QL STRIP: NEGATIVE
RBC # BLD AUTO: 4.71 M/UL (ref 4–5.4)
SODIUM SERPL-SCNC: 138 MMOL/L (ref 136–145)
SP GR UR STRIP: 1.01 (ref 1–1.03)
TOXICOLOGY INFORMATION: ABNORMAL
TSH SERPL DL<=0.005 MIU/L-ACNC: 0.44 UIU/ML (ref 0.4–4)
URN SPEC COLLECT METH UR: NORMAL
UROBILINOGEN UR STRIP-ACNC: NEGATIVE EU/DL
WBC # BLD AUTO: 11.68 K/UL (ref 3.9–12.7)

## 2024-02-26 PROCEDURE — 80143 DRUG ASSAY ACETAMINOPHEN: CPT | Performed by: STUDENT IN AN ORGANIZED HEALTH CARE EDUCATION/TRAINING PROGRAM

## 2024-02-26 PROCEDURE — 80053 COMPREHEN METABOLIC PANEL: CPT | Performed by: STUDENT IN AN ORGANIZED HEALTH CARE EDUCATION/TRAINING PROGRAM

## 2024-02-26 PROCEDURE — 80307 DRUG TEST PRSMV CHEM ANLYZR: CPT | Performed by: STUDENT IN AN ORGANIZED HEALTH CARE EDUCATION/TRAINING PROGRAM

## 2024-02-26 PROCEDURE — 25000003 PHARM REV CODE 250: Performed by: NURSE PRACTITIONER

## 2024-02-26 PROCEDURE — 11400000 HC PSYCH PRIVATE ROOM

## 2024-02-26 PROCEDURE — 82077 ASSAY SPEC XCP UR&BREATH IA: CPT | Performed by: STUDENT IN AN ORGANIZED HEALTH CARE EDUCATION/TRAINING PROGRAM

## 2024-02-26 PROCEDURE — 85025 COMPLETE CBC W/AUTO DIFF WBC: CPT | Performed by: STUDENT IN AN ORGANIZED HEALTH CARE EDUCATION/TRAINING PROGRAM

## 2024-02-26 PROCEDURE — 99285 EMERGENCY DEPT VISIT HI MDM: CPT

## 2024-02-26 PROCEDURE — 81003 URINALYSIS AUTO W/O SCOPE: CPT | Mod: 59 | Performed by: STUDENT IN AN ORGANIZED HEALTH CARE EDUCATION/TRAINING PROGRAM

## 2024-02-26 PROCEDURE — 80061 LIPID PANEL: CPT | Performed by: PSYCHIATRY & NEUROLOGY

## 2024-02-26 PROCEDURE — 25000003 PHARM REV CODE 250: Performed by: PSYCHIATRY & NEUROLOGY

## 2024-02-26 PROCEDURE — 81025 URINE PREGNANCY TEST: CPT | Performed by: STUDENT IN AN ORGANIZED HEALTH CARE EDUCATION/TRAINING PROGRAM

## 2024-02-26 PROCEDURE — 84443 ASSAY THYROID STIM HORMONE: CPT | Performed by: STUDENT IN AN ORGANIZED HEALTH CARE EDUCATION/TRAINING PROGRAM

## 2024-02-26 PROCEDURE — 83036 HEMOGLOBIN GLYCOSYLATED A1C: CPT | Performed by: PSYCHIATRY & NEUROLOGY

## 2024-02-26 RX ORDER — HYDROXYZINE PAMOATE 50 MG/1
50 CAPSULE ORAL EVERY 6 HOURS PRN
Status: DISCONTINUED | OUTPATIENT
Start: 2024-02-26 | End: 2024-02-29 | Stop reason: HOSPADM

## 2024-02-26 RX ORDER — ACETAMINOPHEN 325 MG/1
650 TABLET ORAL EVERY 6 HOURS PRN
Status: DISCONTINUED | OUTPATIENT
Start: 2024-02-26 | End: 2024-02-29 | Stop reason: HOSPADM

## 2024-02-26 RX ORDER — BENZONATATE 100 MG/1
100 CAPSULE ORAL 3 TIMES DAILY PRN
Status: DISCONTINUED | OUTPATIENT
Start: 2024-02-26 | End: 2024-02-29 | Stop reason: HOSPADM

## 2024-02-26 RX ORDER — LORAZEPAM 1 MG/1
1 TABLET ORAL
Status: COMPLETED | OUTPATIENT
Start: 2024-02-26 | End: 2024-02-26

## 2024-02-26 RX ORDER — OLANZAPINE 10 MG/1
10 TABLET ORAL EVERY 8 HOURS PRN
Status: DISCONTINUED | OUTPATIENT
Start: 2024-02-26 | End: 2024-02-29 | Stop reason: HOSPADM

## 2024-02-26 RX ORDER — BENZTROPINE MESYLATE 1 MG/ML
2 INJECTION, SOLUTION INTRAMUSCULAR; INTRAVENOUS EVERY 8 HOURS PRN
Status: DISCONTINUED | OUTPATIENT
Start: 2024-02-26 | End: 2024-02-29 | Stop reason: HOSPADM

## 2024-02-26 RX ORDER — OLANZAPINE 10 MG/2ML
10 INJECTION, POWDER, FOR SOLUTION INTRAMUSCULAR EVERY 8 HOURS PRN
Status: DISCONTINUED | OUTPATIENT
Start: 2024-02-26 | End: 2024-02-29 | Stop reason: HOSPADM

## 2024-02-26 RX ORDER — PROMETHAZINE HYDROCHLORIDE 25 MG/1
25 TABLET ORAL EVERY 6 HOURS PRN
Status: DISCONTINUED | OUTPATIENT
Start: 2024-02-26 | End: 2024-02-29 | Stop reason: HOSPADM

## 2024-02-26 RX ORDER — LOPERAMIDE HYDROCHLORIDE 2 MG/1
2 CAPSULE ORAL
Status: DISCONTINUED | OUTPATIENT
Start: 2024-02-26 | End: 2024-02-29 | Stop reason: HOSPADM

## 2024-02-26 RX ORDER — IBUPROFEN 200 MG
1 TABLET ORAL DAILY PRN
Status: DISCONTINUED | OUTPATIENT
Start: 2024-02-26 | End: 2024-02-28

## 2024-02-26 RX ORDER — ALUMINUM HYDROXIDE, MAGNESIUM HYDROXIDE, AND SIMETHICONE 1200; 120; 1200 MG/30ML; MG/30ML; MG/30ML
30 SUSPENSION ORAL EVERY 6 HOURS PRN
Status: DISCONTINUED | OUTPATIENT
Start: 2024-02-26 | End: 2024-02-29 | Stop reason: HOSPADM

## 2024-02-26 RX ORDER — ONDANSETRON 4 MG/1
4 TABLET, ORALLY DISINTEGRATING ORAL EVERY 8 HOURS PRN
Status: DISCONTINUED | OUTPATIENT
Start: 2024-02-26 | End: 2024-02-29 | Stop reason: HOSPADM

## 2024-02-26 RX ADMIN — LORAZEPAM 1 MG: 1 TABLET ORAL at 04:02

## 2024-02-26 RX ADMIN — HYDROXYZINE PAMOATE 50 MG: 50 CAPSULE ORAL at 08:02

## 2024-02-26 NOTE — ED NOTES
Patient calm and cooperative. Sprite served to patient per her request. Offered patient something to eat and she refused. NATHANIEL sitter at bedside for visual monitoring.

## 2024-02-26 NOTE — ED NOTES
Patient escorted by security and PEC sitter to restroom to get undressed and into hospital scrubs. Patients belongings placed in locked cabinet and safe.
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
## 2024-02-26 NOTE — ED NOTES
Patient calm and cooperative, respirations even and unlabored.  PEC sitter at bedside for visual monitoring.

## 2024-02-27 LAB
CHOLEST SERPL-MCNC: 177 MG/DL (ref 120–199)
CHOLEST/HDLC SERPL: 4.7 {RATIO} (ref 2–5)
ESTIMATED AVG GLUCOSE: 100 MG/DL (ref 68–131)
HBA1C MFR BLD: 5.1 % (ref 4–5.6)
HDLC SERPL-MCNC: 38 MG/DL (ref 40–75)
HDLC SERPL: 21.5 % (ref 20–50)
LDLC SERPL CALC-MCNC: 92.8 MG/DL (ref 63–159)
NONHDLC SERPL-MCNC: 139 MG/DL
TRIGL SERPL-MCNC: 231 MG/DL (ref 30–150)

## 2024-02-27 PROCEDURE — 25000003 PHARM REV CODE 250: Performed by: PSYCHIATRY & NEUROLOGY

## 2024-02-27 PROCEDURE — 36415 COLL VENOUS BLD VENIPUNCTURE: CPT | Performed by: PSYCHIATRY & NEUROLOGY

## 2024-02-27 PROCEDURE — 99223 1ST HOSP IP/OBS HIGH 75: CPT | Mod: ,,, | Performed by: PSYCHIATRY & NEUROLOGY

## 2024-02-27 PROCEDURE — 99231 SBSQ HOSP IP/OBS SF/LOW 25: CPT | Mod: ,,, | Performed by: PHYSICIAN ASSISTANT

## 2024-02-27 PROCEDURE — 90833 PSYTX W PT W E/M 30 MIN: CPT | Mod: ,,, | Performed by: PSYCHIATRY & NEUROLOGY

## 2024-02-27 PROCEDURE — 11400000 HC PSYCH PRIVATE ROOM

## 2024-02-27 PROCEDURE — S4991 NICOTINE PATCH NONLEGEND: HCPCS | Performed by: PSYCHIATRY & NEUROLOGY

## 2024-02-27 RX ORDER — ALPRAZOLAM 0.5 MG/1
2 TABLET ORAL 2 TIMES DAILY PRN
Status: DISCONTINUED | OUTPATIENT
Start: 2024-02-27 | End: 2024-02-29 | Stop reason: HOSPADM

## 2024-02-27 RX ORDER — TRAZODONE HYDROCHLORIDE 100 MG/1
200 TABLET ORAL NIGHTLY
Status: DISCONTINUED | OUTPATIENT
Start: 2024-02-27 | End: 2024-02-29 | Stop reason: HOSPADM

## 2024-02-27 RX ORDER — LURASIDONE HYDROCHLORIDE 40 MG/1
80 TABLET, FILM COATED ORAL DAILY
Status: DISCONTINUED | OUTPATIENT
Start: 2024-02-27 | End: 2024-02-28

## 2024-02-27 RX ADMIN — NICOTINE 1 PATCH: 14 PATCH TRANSDERMAL at 02:02

## 2024-02-27 RX ADMIN — HYDROXYZINE PAMOATE 50 MG: 50 CAPSULE ORAL at 08:02

## 2024-02-27 RX ADMIN — TRAZODONE HYDROCHLORIDE 200 MG: 100 TABLET ORAL at 08:02

## 2024-02-27 RX ADMIN — LURASIDONE HYDROCHLORIDE 80 MG: 40 TABLET, COATED ORAL at 01:02

## 2024-02-27 NOTE — CONSULTS
"[facility] Medicine  Consult Note    Patient Name: Keyla Caal  MRN: [id number]  Admission Date: 2/26/2024  Hospital Length of Stay: 1 days  Attending Physician: Tao Elizabeth MD   Primary Care Provider: Margoth Payne FNP           Patient information was obtained from patient and ER records.     Inpatient consult to [facility] for History and Physical  Consult performed by: Camille Ryan PA-C  Consult ordered by: Tao Elizabeth MD        Subjective:     Principal Problem: <principal problem not specified>    Chief Complaint: No chief complaint on file.       HPI: Patient is a 33 year old female with medical history of bipolar, anxiety and arthritis who was [facility].  Hospital medicine consulted for medical management.        Past Medical History:   Diagnosis Date    RAND (acute kidney injury) 04/25/2023    Anxiety 2015    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     Hx of psychiatric care 2015    Buspar, Vistril, Xanax, Paxil    Elaine     Migraines 2014    Primary Care doctor at [facility]     Oppositional defiant disorder 1999    'I spent only one day at [facility] and was discharged."    Panic disorder 2015    [facility] [place]    Psychiatric exam requested by authority 2015    [facility]    Psychiatric problem 2015    Depression and anxiety    Suicide attempt 2015    Interrupted Attempt    Therapy 2016    [facility] [place]       Past Surgical History:   Procedure Laterality Date    BARTHOLIN GLAND CYST EXCISION      BLADDER SUSPENSION "      ESOPHAGOGASTRODUODENOSCOPY N/A 3/15/2023    Procedure: EGD (ESOPHAGOGASTRODUODENOSCOPY);  Surgeon: Nena Deras MD;  Location: [facility];  Service: Endoscopy;  Laterality: N/A;    KNEE SURGERY      right    LAPAROSCOPIC CHOLECYSTECTOMY N/A 02/03/2022    Procedure: CHOLECYSTECTOMY, LAPAROSCOPIC;  Surgeon: Ramy Mckay MD;  Location: [facility];  Service: General;  Laterality: N/A;    WISDOM TOOTH EXTRACTION         Review of patient's allergies indicates:  No Known Allergies    Current Facility-Administered Medications on File Prior to Encounter   Medication    [COMPLETED] LORazepam tablet 1 mg     Current Outpatient Medications on File Prior to Encounter   Medication Sig    ALPRAZolam (XANAX) 1 MG tablet Take 1 tablet (1 mg total) by mouth 2 (two) times daily as needed for Anxiety.    lumateperone (CAPLYTA) 42 mg Cap Take 42 mg by mouth.    lurasidone (LATUDA) 80 mg Tab tablet 1 tablet with food    temazepam (RESTORIL) 15 mg Cap Take 15 mg by mouth nightly as needed.    traZODone (DESYREL) 100 MG tablet Take 300 mg by mouth every evening.    vortioxetine (TRINTELLIX) 20 mg Tab Take by mouth.    albuterol (VENTOLIN HFA) 90 mcg/actuation inhaler Inhale 2 puffs into the lungs every 6 (six) hours as needed for Wheezing. Rescue    budesonide-formoterol 160-4.5 mcg (SYMBICORT) 160-4.5 mcg/actuation HFAA Inhale 2 puffs into the lungs every 12 (twelve) hours. Controller    metoclopramide HCl (REGLAN) 5 MG tablet Take 1 tablet (5 mg total) by mouth 3 (three) times daily before meals.    nystatin (MYCOSTATIN) ointment Apply topically 2 (two) times daily. for 7 days    omeprazole (PRILOSEC) 20 MG capsule Take 20 mg by mouth 2 (two) times daily.    ondansetron (ZOFRAN-ODT) 4 MG TbDL Take 2 tablets (8 mg total) by mouth 2 (two) times daily.     Family History       Problem Relation (Age of Onset)    Anxiety disorder Mother    Dementia Maternal Grandfather    Hypertension Mother, Father    No Known Problems Sister,  Maternal Aunt, Paternal Aunt, Maternal Uncle, Paternal Uncle, Maternal Grandmother, Paternal Grandfather, Paternal Grandmother, Cousin, Maternal Aunt, Maternal Aunt, Maternal Aunt, Maternal Uncle          Tobacco Use    Smoking status: Every Day     Current packs/day: 1.00     Average packs/day: 1 pack/day for 13.1 years (13.1 ttl pk-yrs)     Types: Cigarettes, Vaping with nicotine     Start date: 1/20/2011    Smokeless tobacco: Never   Substance and Sexual Activity    Alcohol use: Yes     Alcohol/week: 2.0 standard drinks of alcohol     Types: 1 Glasses of wine, 1 Standard drinks or equivalent per week     Comment: occasionally about once a month    Drug use: Yes     Types: Marijuana     Comment: gummies with CBD    Sexual activity: Yes     Partners: Female     Birth control/protection: None     Review of Systems   Constitutional:  Negative for chills and fever.   HENT:  Negative for congestion and drooling.    Respiratory:  Negative for cough and shortness of breath.    Cardiovascular:  Negative for chest pain and leg swelling.   Gastrointestinal:  Negative for constipation, diarrhea, nausea and vomiting.   Genitourinary:  Negative for difficulty urinating and dysuria.   Musculoskeletal:  Negative for arthralgias and gait problem.     Objective:     Vital Signs (Most Recent):  Temp: 97.2 °F (36.2 °C) (02/26/24 2103)  Pulse: 72 (02/26/24 2103)  Resp: 18 (02/26/24 2103)  BP: (!) 142/78 (02/26/24 2103)  SpO2: 96 % (02/26/24 2103) Vital Signs (24h Range):  Temp:  [97.2 °F (36.2 °C)-98.5 °F (36.9 °C)] 97.2 °F (36.2 °C)  Pulse:  [54-72] 72  Resp:  [16-18] 18  SpO2:  [96 %-98 %] 96 %  BP: (125-145)/(66-91) 142/78     Weight: 110.6 kg (243 lb 13.3 oz)  Body mass index is 40.58 kg/m².     Physical Exam  Constitutional:       Appearance: Normal appearance.   HENT:      Head: Normocephalic and atraumatic.   Cardiovascular:      Rate and Rhythm: Normal rate and regular rhythm.   Pulmonary:      Effort: Pulmonary effort is  normal. No respiratory distress.      Breath sounds: Normal breath sounds.   Abdominal:      General: Abdomen is flat. There is no distension.      Palpations: Abdomen is soft.   Musculoskeletal:      Right lower leg: No edema.      Left lower leg: No edema.   Skin:     General: Skin is warm and dry.   Neurological:      Mental Status: She is alert and oriented to person, place, and time. Mental status is at baseline.      Cranial Nerves: Cranial nerves 2-12 are intact.          Significant Labs: UPT  Results for orders placed or performed during the hospital encounter of 03/15/23   POCT urine pregnancy   Result Value Ref Range    POC Preg Test, Ur Negative Negative     Acceptable Yes      U/A  Negative for uTI   UDS  Results for orders placed or performed during the hospital encounter of 02/26/24   Drug screen panel, emergency   Result Value Ref Range    Benzodiazepines Presumptive Positive (A) Negative    Methadone metabolites Negative Negative    Cocaine (Metab.) Negative Negative    Opiate Scrn, Ur Negative Negative    Barbiturate Screen, Ur Negative Negative    Amphetamine Screen, Ur Negative Negative    THC Presumptive Positive (A) Negative    Phencyclidine Negative Negative    Creatinine, Urine 29.2 15.0 - 325.0 mg/dL    Toxicology Information SEE COMMENT      CBC  Results for orders placed or performed during the hospital encounter of 02/26/24   CBC auto differential   Result Value Ref Range    WBC 11.68 3.90 - 12.70 K/uL    RBC 4.71 4.00 - 5.40 M/uL    Hemoglobin 14.1 12.0 - 16.0 g/dL    Hematocrit 43.2 37.0 - 48.5 %    MCV 92 82 - 98 fL    MCH 29.9 27.0 - 31.0 pg    MCHC 32.6 32.0 - 36.0 g/dL    RDW 12.9 11.5 - 14.5 %    Platelets 376 150 - 450 K/uL    MPV 10.4 9.2 - 12.9 fL    Immature Granulocytes 0.3 0.0 - 0.5 %    Gran # (ANC) 7.6 1.8 - 7.7 K/uL    Immature Grans (Abs) 0.03 0.00 - 0.04 K/uL    Lymph # 3.1 1.0 - 4.8 K/uL    Mono # 0.7 0.3 - 1.0 K/uL    Eos # 0.2 0.0 - 0.5 K/uL    Baso #  0.04 0.00 - 0.20 K/uL    nRBC 0 0 /100 WBC    Gran % 65.4 38.0 - 73.0 %    Lymph % 26.4 18.0 - 48.0 %    Mono % 6.3 4.0 - 15.0 %    Eosinophil % 1.3 0.0 - 8.0 %    Basophil % 0.3 0.0 - 1.9 %    Differential Method Automated      CMP  Results for orders placed or performed during the hospital encounter of 02/26/24   Comprehensive metabolic panel   Result Value Ref Range    Sodium 138 136 - 145 mmol/L    Potassium 4.0 3.5 - 5.1 mmol/L    Chloride 103 95 - 110 mmol/L    CO2 28 23 - 29 mmol/L    Glucose 68 (L) 70 - 110 mg/dL    BUN 16 6 - 20 mg/dL    Creatinine 0.8 0.5 - 1.4 mg/dL    Calcium 9.3 8.7 - 10.5 mg/dL    Total Protein 7.3 6.0 - 8.4 g/dL    Albumin 3.7 3.5 - 5.2 g/dL    Total Bilirubin 0.2 0.1 - 1.0 mg/dL    Alkaline Phosphatase 59 55 - 135 U/L    AST 14 10 - 40 U/L    ALT 15 10 - 44 U/L    eGFR >60 >60 mL/min/1.73 m^2    Anion Gap 7 (L) 8 - 16 mmol/L     TSH  Results for orders placed or performed during the hospital encounter of 02/26/24   TSH   Result Value Ref Range    TSH 0.438 0.400 - 4.000 uIU/mL     ETOH  Results for orders placed or performed during the hospital encounter of 02/26/24   Ethanol   Result Value Ref Range    Alcohol, Serum <10 <10 mg/dL     Salicylate  Results for orders placed or performed during the hospital encounter of 12/03/18   Salicylate level   Result Value Ref Range    Salicylate Lvl <5.0 (L) 15.0 - 30.0 mg/dL     Acetaminophen  Results for orders placed or performed during the hospital encounter of 02/26/24   Acetaminophen level   Result Value Ref Range    Acetaminophen (Tylenol), Serum <3.0 (L) 10.0 - 20.0 ug/mL             Significant Imaging: none  Assessment/Plan:     Bipolar disorder  Defer to psych         VTE Risk Mitigation (From admission, onward)      None                Thank you for your consult. I will sign off. Please contact us if you have any additional questions.    Camille Ryan PA-C  Department of [facility] Medicine   [facility]

## 2024-02-27 NOTE — ED NOTES
Patient resting with even and unlabored respirations. PEC sitter at bedside for visual monitoring.

## 2024-02-27 NOTE — H&P
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 ideation        Tao Elizabeth MD  Psychiatry

## 2024-02-27 NOTE — NURSING
"Patient saw a MH provider at [facility] today, Dr Kiser, pt states the provided suggested she come to the ER. States she cut herself the first time in years,(she has a couple of superficial cuts to her L arm). Pt was feeling suicidal, plan to cut her throat, patient was calm and blunted, with paucity of speech and thought, soft voice.States she is  to her wife, Maritza, no children. Voices that she hopes to have no thoughts of cutting herself while here and when she goes home. Contracted for safety. States she is on disability for bipolar and depression. States she does not have any sleep issues, however she take medication to help her sleep. States she currently feels "lonely". Rates her depression as 7/10, anxiety as 8/10, and voiced it was 8/10 before taking the ativan in the ER. States that the tightness in her chest has decreased, since taking the ativan. States she takes xanax 2 mg BID PRN at home, hasn't taken any since Saturday. Voices that she takes caplyta at home , that it works and would like to continue taking it, takes zofran in case this medication makes her nauseated. States she smokes pot and uses the THC gummies also.Voiced understanding of unit rules and POC, signed all paperwork, given a unit tour, snack, and PRN Vistaril.      "

## 2024-02-27 NOTE — HPI
Patient is a 33 year old female with medical history of bipolar, anxiety and arthritis who was admitted to [facility] for depression.  Hospital medicine consulted for medical management.

## 2024-02-27 NOTE — ED NOTES
Report called to RN for BHU. RN given HPI, current vitals and POC. No questions or concerns raised by receiving RN/floor.

## 2024-02-27 NOTE — PLAN OF CARE
Pt denies any needs at this time.  She is blunted and withdrawn but denies current S/I at this time.  Contracts for safety.  Denies H/I or A/V hallucinations.  Pt awaiting to see the dr today but doesn't voice any complaints at this time, will continue to monitor.

## 2024-02-27 NOTE — NURSING
Informed this patient to call family members to see if they could bring her home medication of Caplyta. Patient reported her family would be able to bring this medication tonight.

## 2024-02-27 NOTE — NURSING
Pt to [facility] via wheelchair accompanied by MHT and security. Pt wanded on arrival and belongings inventoried. No weapons or paraphernalia found. Skin assessment completed. Pt changed into paper scrubs and nonskid socks. NADN. Safety precautions remain in place.
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
## 2024-02-27 NOTE — NURSING
Received report from Jose M LEARY. Patient came to the ER with SI, thoughts to slit throat, has superficial cuts to L arm. (+)THC , benzos. Received Ativan 1 mg PO, patient has been calm and cooperative.

## 2024-02-27 NOTE — PROGRESS NOTES
"   02/27/24 1420   [facility] Group Therapy   Group Name Therapeutic Recreation   Specific Interventions Coping Skills Training   Participation Level Appropriate   Participation Quality Cooperative   Insight/Motivation Improved;Applies New Skills   Affect/Mood Display Blunted   Cognition Alert   Psychomotor WNL     Patient presents blunted, quiet unless approached, willing to participate, reports an "okay" mood. Patient participated in a cognitive skill activity to improve learning skills, decision making and promote enjoyment. Patient remained alert, involved and understood directions.  "

## 2024-02-27 NOTE — PROGRESS NOTES
"   02/27/24 1020   [facility] Group Therapy   Group Name Therapeutic Recreation   Specific Interventions Cognitive Stimulation Training   Participation Level Minimal   Participation Quality Lack of Interest;Withdrawn   Insight/Motivation Limited   Affect/Mood Display Depressed;Blunted   Cognition Alert   Psychomotor WNL     Patient presents depressed, blunted, withdrawn, lacks interest, requires prompting to remained involved. Patient reports a better mood, "I'm here because I was threatening to self-harm, just to cut for release... I want to get rid of the thoughts."  "

## 2024-02-27 NOTE — SUBJECTIVE & OBJECTIVE
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     Oppositional defiant disorder 1999    'I spent only one day at [facility] and was discharged."    Panic disorder 2015    [facility]    Psychiatric exam requested by authority 2015    [facility]    Psychiatric problem 2015    Depression and anxiety    Suicide attempt 2015    Interrupted Attempt    Therapy 2016    [facility]       Past Surgical History:   Procedure Laterality Date    BARTHOLIN GLAND CYST EXCISION      BLADDER SUSPENSION      ESOPHAGOGASTRODUODENOSCOPY N/A 3/15/2023    Procedure: EGD (ESOPHAGOGASTRODUODENOSCOPY);  Surgeon: Nena Deras MD;  Location: [facility];  Service: Endoscopy;  Laterality: N/A;    KNEE SURGERY      right    LAPAROSCOPIC CHOLECYSTECTOMY N/A 02/03/2022    Procedure: CHOLECYSTECTOMY, LAPAROSCOPIC;  Surgeon: Ramy Mckay MD;  Location: [facility];  Service: General;  Laterality: N/A;    WISDOM TOOTH EXTRACTION         Review of patient's allergies indicates:  No Known Allergies    Current Facility-Administered Medications on File Prior to Encounter   Medication    [COMPLETED] LORazepam tablet 1 mg     Current Outpatient Medications on File Prior to Encounter   Medication Sig    ALPRAZolam (XANAX) 1 MG tablet Take 1 tablet (1 mg total) by mouth 2 (two) times daily as needed for Anxiety.    lumateperone (CAPLYTA) " 42 mg Cap Take 42 mg by mouth.    lurasidone (LATUDA) 80 mg Tab tablet 1 tablet with food    temazepam (RESTORIL) 15 mg Cap Take 15 mg by mouth nightly as needed.    traZODone (DESYREL) 100 MG tablet Take 300 mg by mouth every evening.    vortioxetine (TRINTELLIX) 20 mg Tab Take by mouth.    albuterol (VENTOLIN HFA) 90 mcg/actuation inhaler Inhale 2 puffs into the lungs every 6 (six) hours as needed for Wheezing. Rescue    budesonide-formoterol 160-4.5 mcg (SYMBICORT) 160-4.5 mcg/actuation HFAA Inhale 2 puffs into the lungs every 12 (twelve) hours. Controller    metoclopramide HCl (REGLAN) 5 MG tablet Take 1 tablet (5 mg total) by mouth 3 (three) times daily before meals.    nystatin (MYCOSTATIN) ointment Apply topically 2 (two) times daily. for 7 days    omeprazole (PRILOSEC) 20 MG capsule Take 20 mg by mouth 2 (two) times daily.    ondansetron (ZOFRAN-ODT) 4 MG TbDL Take 2 tablets (8 mg total) by mouth 2 (two) times daily.     Family History       Problem Relation (Age of Onset)    Anxiety disorder Mother    Dementia Maternal Grandfather    Hypertension Mother, Father    No Known Problems Sister, Maternal Aunt, Paternal Aunt, Maternal Uncle, Paternal Uncle, Maternal Grandmother, Paternal Grandfather, Paternal Grandmother, Cousin, Maternal Aunt, Maternal Aunt, Maternal Aunt, Maternal Uncle          Tobacco Use    Smoking status: Every Day     Current packs/day: 1.00     Average packs/day: 1 pack/day for 13.1 years (13.1 ttl pk-yrs)     Types: Cigarettes, Vaping with nicotine     Start date: 1/20/2011    Smokeless tobacco: Never   Substance and Sexual Activity    Alcohol use: Yes     Alcohol/week: 2.0 standard drinks of alcohol     Types: 1 Glasses of wine, 1 Standard drinks or equivalent per week     Comment: occasionally about once a month    Drug use: Yes     Types: Marijuana     Comment: gummies with CBD    Sexual activity: Yes     Partners: Female     Birth control/protection: None     Review of Systems    Constitutional:  Negative for chills and fever.   HENT:  Negative for congestion and drooling.    Respiratory:  Negative for cough and shortness of breath.    Cardiovascular:  Negative for chest pain and leg swelling.   Gastrointestinal:  Negative for constipation, diarrhea, nausea and vomiting.   Genitourinary:  Negative for difficulty urinating and dysuria.   Musculoskeletal:  Negative for arthralgias and gait problem.     Objective:     Vital Signs (Most Recent):  Temp: 97.2 °F (36.2 °C) (02/26/24 2103)  Pulse: 72 (02/26/24 2103)  Resp: 18 (02/26/24 2103)  BP: (!) 142/78 (02/26/24 2103)  SpO2: 96 % (02/26/24 2103) Vital Signs (24h Range):  Temp:  [97.2 °F (36.2 °C)-98.5 °F (36.9 °C)] 97.2 °F (36.2 °C)  Pulse:  [54-72] 72  Resp:  [16-18] 18  SpO2:  [96 %-98 %] 96 %  BP: (125-145)/(66-91) 142/78     Weight: 110.6 kg (243 lb 13.3 oz)  Body mass index is 40.58 kg/m².     Physical Exam  Constitutional:       Appearance: Normal appearance.   HENT:      Head: Normocephalic and atraumatic.   Cardiovascular:      Rate and Rhythm: Normal rate and regular rhythm.   Pulmonary:      Effort: Pulmonary effort is normal. No respiratory distress.      Breath sounds: Normal breath sounds.   Abdominal:      General: Abdomen is flat. There is no distension.      Palpations: Abdomen is soft.   Musculoskeletal:      Right lower leg: No edema.      Left lower leg: No edema.   Skin:     General: Skin is warm and dry.   Neurological:      Mental Status: She is alert and oriented to person, place, and time. Mental status is at baseline.      Cranial Nerves: Cranial nerves 2-12 are intact.          Significant Labs: UPT  Results for orders placed or performed during the hospital encounter of 03/15/23   POCT urine pregnancy   Result Value Ref Range    POC Preg Test, Ur Negative Negative     Acceptable Yes      U/A  Negative for uTI   UDS  Results for orders placed or performed during the hospital encounter of 02/26/24    Drug screen panel, emergency   Result Value Ref Range    Benzodiazepines Presumptive Positive (A) Negative    Methadone metabolites Negative Negative    Cocaine (Metab.) Negative Negative    Opiate Scrn, Ur Negative Negative    Barbiturate Screen, Ur Negative Negative    Amphetamine Screen, Ur Negative Negative    THC Presumptive Positive (A) Negative    Phencyclidine Negative Negative    Creatinine, Urine 29.2 15.0 - 325.0 mg/dL    Toxicology Information SEE COMMENT      CBC  Results for orders placed or performed during the hospital encounter of 02/26/24   CBC auto differential   Result Value Ref Range    WBC 11.68 3.90 - 12.70 K/uL    RBC 4.71 4.00 - 5.40 M/uL    Hemoglobin 14.1 12.0 - 16.0 g/dL    Hematocrit 43.2 37.0 - 48.5 %    MCV 92 82 - 98 fL    MCH 29.9 27.0 - 31.0 pg    MCHC 32.6 32.0 - 36.0 g/dL    RDW 12.9 11.5 - 14.5 %    Platelets 376 150 - 450 K/uL    MPV 10.4 9.2 - 12.9 fL    Immature Granulocytes 0.3 0.0 - 0.5 %    Gran # (ANC) 7.6 1.8 - 7.7 K/uL    Immature Grans (Abs) 0.03 0.00 - 0.04 K/uL    Lymph # 3.1 1.0 - 4.8 K/uL    Mono # 0.7 0.3 - 1.0 K/uL    Eos # 0.2 0.0 - 0.5 K/uL    Baso # 0.04 0.00 - 0.20 K/uL    nRBC 0 0 /100 WBC    Gran % 65.4 38.0 - 73.0 %    Lymph % 26.4 18.0 - 48.0 %    Mono % 6.3 4.0 - 15.0 %    Eosinophil % 1.3 0.0 - 8.0 %    Basophil % 0.3 0.0 - 1.9 %    Differential Method Automated      CMP  Results for orders placed or performed during the hospital encounter of 02/26/24   Comprehensive metabolic panel   Result Value Ref Range    Sodium 138 136 - 145 mmol/L    Potassium 4.0 3.5 - 5.1 mmol/L    Chloride 103 95 - 110 mmol/L    CO2 28 23 - 29 mmol/L    Glucose 68 (L) 70 - 110 mg/dL    BUN 16 6 - 20 mg/dL    Creatinine 0.8 0.5 - 1.4 mg/dL    Calcium 9.3 8.7 - 10.5 mg/dL    Total Protein 7.3 6.0 - 8.4 g/dL    Albumin 3.7 3.5 - 5.2 g/dL    Total Bilirubin 0.2 0.1 - 1.0 mg/dL    Alkaline Phosphatase 59 55 - 135 U/L    AST 14 10 - 40 U/L    ALT 15 10 - 44 U/L    eGFR >60 >60  mL/min/1.73 m^2    Anion Gap 7 (L) 8 - 16 mmol/L     TSH  Results for orders placed or performed during the hospital encounter of 02/26/24   TSH   Result Value Ref Range    TSH 0.438 0.400 - 4.000 uIU/mL     ETOH  Results for orders placed or performed during the hospital encounter of 02/26/24   Ethanol   Result Value Ref Range    Alcohol, Serum <10 <10 mg/dL     Salicylate  Results for orders placed or performed during the hospital encounter of 12/03/18   Salicylate level   Result Value Ref Range    Salicylate Lvl <5.0 (L) 15.0 - 30.0 mg/dL     Acetaminophen  Results for orders placed or performed during the hospital encounter of 02/26/24   Acetaminophen level   Result Value Ref Range    Acetaminophen (Tylenol), Serum <3.0 (L) 10.0 - 20.0 ug/mL             Significant Imaging: none

## 2024-02-27 NOTE — NURSING
Pt sleeping at this time, slept 6.5 hrs with 1 awakenings. NAD. Resp even and unlabored.Pathways clear,bed in low position. Q 15 min safety check ongoing.All precautions maintained.

## 2024-02-28 PROCEDURE — 25000003 PHARM REV CODE 250: Performed by: PSYCHIATRY & NEUROLOGY

## 2024-02-28 PROCEDURE — 90833 PSYTX W PT W E/M 30 MIN: CPT | Mod: ,,, | Performed by: PSYCHIATRY & NEUROLOGY

## 2024-02-28 PROCEDURE — S4991 NICOTINE PATCH NONLEGEND: HCPCS | Performed by: PSYCHIATRY & NEUROLOGY

## 2024-02-28 PROCEDURE — 11400000 HC PSYCH PRIVATE ROOM

## 2024-02-28 PROCEDURE — 99233 SBSQ HOSP IP/OBS HIGH 50: CPT | Mod: ,,, | Performed by: PSYCHIATRY & NEUROLOGY

## 2024-02-28 RX ORDER — IBUPROFEN 200 MG
1 TABLET ORAL DAILY
Status: DISCONTINUED | OUTPATIENT
Start: 2024-02-28 | End: 2024-02-29 | Stop reason: HOSPADM

## 2024-02-28 RX ORDER — LURASIDONE HYDROCHLORIDE 40 MG/1
80 TABLET, FILM COATED ORAL
Status: DISCONTINUED | OUTPATIENT
Start: 2024-02-29 | End: 2024-02-29 | Stop reason: HOSPADM

## 2024-02-28 RX ADMIN — LURASIDONE HYDROCHLORIDE 80 MG: 40 TABLET, COATED ORAL at 08:02

## 2024-02-28 RX ADMIN — NICOTINE 1 PATCH: 14 PATCH, EXTENDED RELEASE TRANSDERMAL at 08:02

## 2024-02-28 RX ADMIN — HYDROXYZINE PAMOATE 50 MG: 50 CAPSULE ORAL at 07:02

## 2024-02-28 RX ADMIN — VORTIOXETINE 20 MG: 20 TABLET, FILM COATED ORAL at 08:02

## 2024-02-28 RX ADMIN — TRAZODONE HYDROCHLORIDE 200 MG: 100 TABLET ORAL at 08:02

## 2024-02-28 NOTE — PLAN OF CARE
Recommendations  1. Continue lactose restricted diet.   2. Monitor weights.   3. RD to follow.    Goals: Pt will continue to meet at least 75% ENN by RD follow up.  Nutrition Goal Status: new

## 2024-02-28 NOTE — PLAN OF CARE
Problem: Adult Behavioral Health Plan of Care  Goal: Optimized Coping Skills in Response to Life Stressors  Intervention: Promote Effective Coping Strategies  Flowsheets (Taken 2/28/2024 0090)  Supportive Measures:   active listening utilized   counseling provided   decision-making supported   self-reflection promoted   self-responsibility promoted   Process Group        Behavior:  Pt attended group dressed in personal clothing. Pt attentive and engaged.      Intervention:     Process discussion on symptoms of indication of processing thoughts with discharge planning. Patients were encouraged to identify ways to cope with and reflect on what are the plans when they are discharged and what coping skills did they learn while in the hospital?        Response:  Pt affect blunted with anxious mood. Pt  states that she has learned that she hs to ask for help and to let her spouse know when she is experiencing the feelings of SI and try to get through the situation vs arguing with her spouse.      Plan:  Staff will continue to assess and obtain collaterals as needed.  Staff will coordinate discharge to home with residential treatment when deemed stable.

## 2024-02-28 NOTE — MEDICAL/APP STUDENT
PSYCHIATRY DAILY INPATIENT PROGRESS NOTE  SUBSEQUENT HOSPITAL VISIT    ENCOUNTER DATE: 2/28/2024  SITE: [facility]    DATE OF ADMISSION: 2/26/2024  8:25 PM  LENGTH OF STAY: 2 days      CHIEF COMPLAINT   Keyla Caal is a 33 y.o. female, seen during daily tang rounds on the inpatient unit.  Keyla Caal presented with the chief complaint of behavior problems and relational problem      The patient was seen and examined. The chart was reviewed.     Reviewed notes from Rns and labs from the last 24 hours.    The patient's case was discussed with the treatment team/care providers today including Rns and MD    Staff reports no behavioral or management issues.     The patient has been compliant with treatment.      Subjective 02/28/2024       Today the patient reports improvement in mood and no longer endorse suicidal ideation. Patient missed her wife and mentions that she would like to be discharge tomorrow. She mentions that she came in because she was fearful of her suicidal thoughts and wanted to be in safe space, but now she is calmer and no longer have suicidal thoughts, she would like to go back home to her wife.     Patient reports that her diet recommendations are incorrect - not vegetarian or lactose intolerant. Pt report she usually take her medications at night but haven't received any.      ***      The patient denies any side effects to medications.        Interim/overnight events per report/notes:          Psychiatric ROS (observed, reported, or endorsed/denied):  Depressed mood - No  Interest/pleasure/anhedonia: No  Guilt/hopelessness/worthlessness - No  Changes in Sleep - No  Changes in Appetite - No  Changes in Concentration - No  Changes in Energy - No  PMA/R- No  Suicidal- active/passive ideations - No  Homicidal ideations: active/passive ideations - No    Hallucinations - No  Delusions - No  Disorganized behavior - No  Disorganized speech - No  Negative symptoms - No    Elevated  "mood - No  Decreased need for sleep - No  Grandiosity - No  Racing thoughts - No  Impulsivity - No  Irritability- No  Increased energy - No  Distractibility - No  Increase in goal-directed activity or PMA- No    Symptoms of ALEKSANDRA - No  Symptoms of Panic Disorder- No  Symptoms of PTSD - No        Overall progress: Patient is back to baseline        Psychotherapy:  Target symptoms: {PSY TARGET SYMPTOMS:05383}  Why chosen therapy is appropriate versus another modality: {reason:73578}  Outcome monitoring methods: {methods:08214}  Therapeutic intervention type: {types:49916}  Topics discussed/themes: {Topics:20403}  The patient's response to the intervention is {PSY INTERVENTION RESPONSE:35575}. The patient's progress toward treatment goals is {Progress:20262}.   Duration of intervention: *** minutes.        Medical ROS  Review of Systems   All other systems reviewed and are negative.        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     Oppositional defiant disorder 1999    'I spent only one day at [facility] and was discharged."    Panic disorder 2015    [facility]    Psychiatric exam requested by authority 2015    [facility]    Psychiatric problem 2015    Depression and anxiety    Suicide attempt 2015    Interrupted Attempt    Therapy 2016    [facility] " "          PSYCHOTROPIC MEDICATIONS   Scheduled Meds:   lumateperone  42 mg Oral QHS    lurasidone  80 mg Oral Daily    nicotine  1 patch Transdermal Daily    traZODone  200 mg Oral QHS    vortioxetine  20 mg Oral Nightly     Continuous Infusions:  PRN Meds:.acetaminophen, ALPRAZolam, aluminum-magnesium hydroxide-simethicone, benzonatate, benztropine mesylate, hydrOXYzine pamoate, loperamide, OLANZapine **AND** OLANZapine, ondansetron, promethazine        EXAMINATION    VITALS   Vitals:    02/26/24 2103 02/27/24 1937 02/28/24 0732   BP: (!) 142/78 (!) 109/53 (!) 105/54   BP Location: Right arm Right arm Right arm   Patient Position: Sitting Sitting Lying   Pulse: 72 (!) 58 (!) 59   Resp: 18 18 16   Temp: 97.2 °F (36.2 °C) 96.8 °F (36 °C) 97.4 °F (36.3 °C)   TempSrc: Temporal Temporal Temporal   SpO2: 96%     Weight: 110.6 kg (243 lb 13.3 oz)     Height: 5' 5" (1.651 m)         Body mass index is 40.58 kg/m².        CONSTITUTIONAL  General Appearance: unremarkable, age appropriate    MUSCULOSKELETAL  Muscle Strength and Tone:no tremor, no tic  Abnormal Involuntary Movements: No  Gait and Station: non-ataxic    PSYCHIATRIC   Level of Consciousness: awake and alert   Orientation: person, place, and situation  Grooming: Casually dressed and Well groomed  Psychomotor Behavior: normal, cooperative  Speech: normal tone, normal rate, normal pitch, normal volume  Language: grossly intact  Mood: neutral and the same/no change  Affect: Consistent with mood  Thought Process: linear, logical  Associations: intact   Thought Content: denies SI, denies HI, and no delusions  Perceptions: no TH and not RIS  Memory: Able to recall past events, Remote intact, and Recent intact  Attention:Attends to interview without distraction  Fund of Knowledge: Aware of current events and Vocabulary appropriate   Estimate if Intelligence:  Average based on work/education history, vocabulary and mental status exam  Insight: intact  Judgment: behavior " is adequate to circumstances        DIAGNOSTIC TESTING   Laboratory Results  No results found for this or any previous visit (from the past 24 hour(s)).          MEDICAL DECISION MAKING      ASSESSMENT:     Explored patient thoughts of any suicidal ideation or planning.     ***        PROBLEM LIST AND MANAGEMENT PLANS    Suicidal ideation. No longer endorse any SI. Discharge 2/29/24 if patient remains stable.    ***            Discussed diagnosis, risks and benefits of proposed treatment vs alternative treatments vs no treatment, potential side effects of these treatments and the inherent unpredictability of treatment. The patient expresses understanding of the above and displays the capacity to agree with this treatment given said understanding. Patient also agrees that, currently, the benefits outweigh the risks and would like to pursue/continue treatment at this time.    Any medications being used off-label were discussed with the patient inclusive of the evidence base for the use of the medications and consent was obtained for the off-label use of the medication.       DISCHARGE PLANNING  Expected Disposition Plan: Home or Self Care      NEED FOR CONTINUED HOSPITALIZATION  Psychiatric illness continues to pose a potential threat to life or bodily function, of self or others, thereby requiring the need for continued inpatient psychiatric hospitalization: Yes, due to: ***, as evidenced by:  {just:34828}    Protective inpatient pyschiatric hospitalization required while a safe disposition plan is enacted: Yes    Patient stabilized and ready for discharge from inpatient psychiatric unit: No        STAFF:   [place], MS3  Psychiatry

## 2024-02-28 NOTE — PROGRESS NOTES
"PSYCHIATRY DAILY INPATIENT PROGRESS NOTE  SUBSEQUENT HOSPITAL VISIT    ENCOUNTER DATE: 2/28/2024  SITE: [facility] [place]    DATE OF ADMISSION: 2/26/2024  8:25 PM  LENGTH OF STAY: 2 days      CHIEF COMPLAINT   Keyla Caal is a 33 y.o. female, seen during daily tang rounds on the inpatient unit.  Keyla Caal presented with the chief complaint of depression/SI, "I had a bad day."     The patient was seen and examined. The chart was reviewed.     Reviewed notes from Rns, PA, and CTRS and labs from the last 24 hours.    The patient's case was discussed with the treatment team/care providers today including Rns, Speciality services, and LPC    Staff reports no behavioral or management issues.     The patient has been compliant with treatment.      Subjective 02/28/2024       Today the patient reports that she is feeling better. She is now denying all symptoms. "I had  a fight with my wife and got really upset.. I feel better now."    Symptoms appear to be most consistent with a borderline crisis.    She reports that she had a positive conversation with her wife last year.       The patient denies any side effects to medications.          Psychiatric ROS (observed, reported, or endorsed/denied):  Depressed mood - No  Interest/pleasure/anhedonia: No  Guilt/hopelessness/worthlessness - No  Changes in Sleep - No  Changes in Appetite - No  Changes in Concentration - No  Changes in Energy - No  PMA/R- No  Suicidal- active/passive ideations - No  Homicidal ideations: active/passive ideations - No    Hallucinations - No  Delusions - No  Disorganized behavior - No  Disorganized speech - No  Negative symptoms - No    Elevated mood - No  Decreased need for sleep - No  Grandiosity - No  Racing thoughts - No  Impulsivity - No  Irritability- No  Increased energy - No  Distractibility - No  Increase in goal-directed activity or PMA- No    Symptoms of ALEKSANDRA - No  Symptoms of Panic Disorder- No  Symptoms of PTSD - " "No        Overall progress: Patient is showing significant improvement        Psychotherapy:  Target symptoms: depression, anxiety   Why chosen therapy is appropriate versus another modality: relevant to diagnosis, patient responds to this modality, evidence based practice  Outcome monitoring methods: self-report, observation  Therapeutic intervention type: insight oriented psychotherapy, behavior modifying psychotherapy, supportive psychotherapy, interactive psychotherapy  Topics discussed/themes: building skills sets for symptom management, symptom recognition  The patient's response to the intervention is accepting. The patient's progress toward treatment goals is fair.   Duration of intervention: 18 minutes.        Medical ROS  General ROS: negative  Ophthalmic ROS: negative  ENT ROS: negative  Allergy and Immunology ROS: negative  Hematological and Lymphatic ROS: negative  Endocrine ROS: negative  Respiratory ROS: no cough, shortness of breath, or wheezing  Cardiovascular ROS: no chest pain or dyspnea on exertion  Gastrointestinal ROS: no abdominal pain, change in bowel habits, or black or bloody stools  Genito-Urinary ROS: no dysuria, trouble voiding, or hematuria  Musculoskeletal ROS: negative  Neurological ROS: no TIA or stroke symptoms  Dermatological ROS: negative      PAST MEDICAL HISTORY   Past Medical History:   Diagnosis Date    RAND (acute kidney injury) 04/25/2023    Anxiety 2015    [facility]     Arthritis     Biliary dyskinesia     Bipolar disorder 2016    [facility]    Chronic knee pain     Depression 2016    '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, anger issues."     Fatty liver 6/29/2023    GERD (gastroesophageal reflux disease)     History of psychiatric hospitalization 2015    [facility]     Hx of psychiatric care 2015    Buspar, Vistril, Xanax, Paxil    Elaine     Migraines 2014    Primary Care doctor at [facility]     " "Oppositional defiant disorder 1999    'I spent only one day at [facility] and was discharged."    Panic disorder 2015    [facility]    Psychiatric exam requested by authority 2015    [facility]    Psychiatric problem 2015    Depression and anxiety    Suicide attempt 2015    Interrupted Attempt    Therapy 2016    [facility]           PSYCHOTROPIC MEDICATIONS   Scheduled Meds:   lumateperone  42 mg Oral QHS    lurasidone  80 mg Oral Daily    nicotine  1 patch Transdermal Daily    traZODone  200 mg Oral QHS    vortioxetine  20 mg Oral Nightly     Continuous Infusions:  PRN Meds:.acetaminophen, ALPRAZolam, aluminum-magnesium hydroxide-simethicone, benzonatate, benztropine mesylate, hydrOXYzine pamoate, loperamide, OLANZapine **AND** OLANZapine, ondansetron, promethazine        EXAMINATION    VITALS   Vitals:    02/26/24 2103 02/27/24 1937 02/28/24 0732 02/28/24 0800   BP: (!) 142/78 (!) 109/53 (!) 105/54    BP Location: Right arm Right arm Right arm    Patient Position: Sitting Sitting Lying    Pulse: 72 (!) 58 (!) 59    Resp: 18 18 16    Temp: 97.2 °F (36.2 °C) 96.8 °F (36 °C) 97.4 °F (36.3 °C)    TempSrc: Temporal Temporal Temporal    SpO2: 96%      Weight: 110.6 kg (243 lb 13.3 oz)   109.4 kg (241 lb 1.2 oz)   Height: 5' 5" (1.651 m)          Body mass index is 40.12 kg/m².    CONSTITUTIONAL  General Appearance: unremarkable, age appropriate, obese     MUSCULOSKELETAL  Muscle Strength and Tone:no dyskinesia, no dystonia, no tremor, no tic  Abnormal Involuntary Movements: No  Gait and Station: non-ataxic     PSYCHIATRIC   Level of Consciousness: awake and alert   Orientation: person, place, time, and situation  Grooming: Casually dressed and Well groomed  Psychomotor Behavior: normal, cooperative, eye contact normal  Speech: normal tone, normal rate, normal pitch, normal volume, spontaneous  Language: grossly intact, able to name, able to repeat  Mood: " anxious and depressed  Affect: Anxious, Consistent with mood, Congruent with thought, and Constricted  Thought Process: linear, logical  Associations: intact   Thought Content: denies SI, denies HI, and no delusions  Perceptions: denies AH and denies  VH  Memory: Able to recall past events, Remote intact, and Recent intact  Attention:Normal and Attends to interview without distraction  Fund of Knowledge: Aware of current events and Vocabulary appropriate   Estimate if Intelligence:  Average based on work/education history, vocabulary and mental status exam  Insight: intact, has awareness of illness- fair  Judgment: behavior is adequate to circumstances- fair        DIAGNOSTIC TESTING   Laboratory Results  No results found for this or any previous visit (from the past 24 hour(s)).          MEDICAL DECISION MAKING      ASSESSMENT:   Bipolar Disorder NOS mre depressed, severe  without psychotic features  Unspecified Anxiety Disorder  Unspecified Trauma  disorder     Borderline Personality Disorder     Nicotine Dependence     Psychosocial stressors     Obesity        PROBLEM LIST AND MANAGEMENT PLANS     Mood: pt counseled  -resumed/continue home lumaterperone 42 mg po q HS  -Resumed/continue Latuda 80 mg po q HS     **pt tried and failed multiple mono therapies including seroquel, zyprexa, risperdal, latuda and others**       Anxiety: pt counseled  -resumed/continue  trazodone 200 mg po q HS  -resumed/continue TRintellix 20 mg po q day   -resumed/continue xanax 2 mg po BID prn     Trauma: pt counseled  -Meds as above     BPD: pt counseled  -meds as above     Nicotine Dependence: pt counseled  -Start nicotine 14 mg patch dermal q day     Psychosocial stressors: pt counseled  Sw consulted to assist with resources     Obesity: pt counseled  -Checked Lipid panel (high triglycerides/low HDL) and HgA1c- 5.1  -dietician consulted       Discussed diagnosis, risks and benefits of proposed treatment vs alternative treatments vs  no treatment, potential side effects of these treatments and the inherent unpredictability of treatment. The patient expresses understanding of the above and displays the capacity to agree with this treatment given said understanding. Patient also agrees that, currently, the benefits outweigh the risks and would like to pursue/continue treatment at this time.    Any medications being used off-label were discussed with the patient inclusive of the evidence base for the use of the medications and consent was obtained for the off-label use of the medication.       DISCHARGE PLANNING  Expected Disposition Plan: Home or Self Care- discharge tomorrow if stable/pending      NEED FOR CONTINUED HOSPITALIZATION  Psychiatric illness continues to pose a potential threat to life or bodily function, of self or others, thereby requiring the need for continued inpatient psychiatric hospitalization: Yes, due to: danger to self, gravely disabled, and suicidal ideation, as evidenced by:  Concerns with SI--Fading.    Protective inpatient pyschiatric hospitalization required while a safe disposition plan is enacted: Yes    Patient stabilized and ready for discharge from inpatient psychiatric unit: No        STAFF:   Tao Elizabeth MD  Psychiatry

## 2024-02-28 NOTE — PROGRESS NOTES
"   02/27/24 1440   Assessment   Patient's Identification of the Problem Patient presents anxious, depressed, reports an "okay" mood. Patient states her admit is due to "because my counselor and my wife were worried that I would do more harm cutting because I want to release anger and depression... I feel at ease when I feel the blood flowing. It calms me down." Patient verbalized she didn't plan on killing herself when cutting, but "if it happens, it happens." Patient admits to negative leisure lifestyle of cigarettes, alcohol occasionally, and marijuana daily. Patient reports she is , has no children, has her GED, is disabled, receive SSI, is suppose to wear glasses(does not have glasses with her), lives in [place] with her wife. Patient verbalized her main goal, "get back to not cutting. If it's thought about it's not pushed on me."   Leisure Interest Listen to Music;Gardening;Sit Outside;Cooking;Enjoy Family/Friends;Fishing;Hunting;Enjoy Nature  (vegetable garden, take a bath by herself and listen to music)   Leisure Barriers Too Busy;Loss of Interest;Energy Level;Self Confidence   Treatment Focus To Improve Mood;To Increase Motivation;To Improve Leisure Awareness/Lifestyle/Interest;Increase Self Confidence;To Promote Successful and Safe Self Expression;To Improve Coping Skills;To Increase Energy Level       Treatment Recommendation:   1:1 Intervention (as needed)    Cognitive Stimulation Skilled Activity  Self Expression Skilled Activity  Mild Exercises Skilled Activity  Coping Skilled Activity  Leisure Education and Awareness Skilled Activity    Treatment Goal(s):  Long Term Goals Refer To Master Treatment Plan    Short Term Treatment Goal(s)  Patient Will:  Comply with Treatment  Exhibit Improvement in Mood  Demonstrate Constructive Expression of Feelings and Behavior  Verbalize Improvement in Mood  Identify at Least 2 Coping Skills or Leisure Skills to Reduce Depression and Hopelessness Upon Request from " Therapist    Discharge Recommendations:  Encourage Patient to Actively Utilize Available Community Resources to Increase Leisure Involvement to Decrease Signs and Symptoms of Illness  Encourage Patient to Utilize Coping Skills on a Regular Basis to Reduce the Risk of Decompensating and Re-Hospitalizations  Support Group  Follow Up with After Care Appointments

## 2024-02-28 NOTE — PROGRESS NOTES
"   02/28/24 1202   [facility] Group Therapy   Group Name Other  (1:1)   Specific Interventions Coping Skills Training   Participation Level Sharing   Participation Quality Cooperative   Insight/Motivation Improved   Affect/Mood Display Depressed;Anxious   Cognition Alert   Psychomotor WNL     Patient presents blunted, anxious, depressed, reports an okay mood. Patient shared she doesn't have much free time because she volunteers at the animal shelter. Patient verbalized she smokes marijuana daily, "it helps me relax, think and it helps my stomach settle and keep my medicine down."Patient shared healthy leisure activities she participate in are taking a shower alone while listening to music, growing her vegetable garden and eating healthy vegetables.  "

## 2024-02-28 NOTE — PLAN OF CARE
Problem: Adult Behavioral Health Plan of Care  Goal: Rounds/Family Conference  Outcome: Ongoing, Progressing  Flowsheets (Taken 2/28/2024 0907)  Participants:   psychiatrist   nursing   other ([facility] and med students)   TREATMENT TEAM      Chief Complaint:    Pt is a 33 year old female with chief complaints of depression and SI  Pt states  she was arguing with her spouse for 2 weeks about financial stressors and she felt like she was a threat to herself and went to see her counselor and came here.      Pt Goal(s):      Pt states she would like to attend an IOP upon discharge.  Current Progress:   Pt attended treatment team dressed in    Pt affect anxious/depressed mood  Pt states that she spoke to her wife and states that this incident may have brought them closer and have resolved the problem with finances.  Pt states she is feeling better and did a self talk to herself about dealing with the real world and how to handle her situation better.  Pt states it was the sadness that she  was feeling and states that is why she said the things she said.   Pt states she has a good support system with family and her wife.    Program/groups:      Encourage pt to attend all groups. Pt participated in group and reported she felt in an o.k. mood.     Revisions to Plan:     Encourage pt to attend treatment team and to attend all groups. Recommendations are for pt to attend psychotherapy and medication management.

## 2024-02-28 NOTE — CONSULTS
"  [facility]  Adult Nutrition  Consult Note    SUMMARY     Recommendations  1. Continue lactose restricted diet.   2. Monitor weights.   3. RD to follow.    Goals: Pt will continue to meet at least 75% ENN by RD follow up.  Nutrition Goal Status: new  Communication of RD Recs: other (comment) (POC)    Assessment and Plan    No nutrition diagnosis at this time. Please re-consult if any acute nutrition concerns arise prior to RD follow up on 3/6/24.           Malnutrition Assessment     No NFPE - PEC                                Reason for Assessment    Reason For Assessment: consult  Diagnosis: psychological disorder  Past Medical History:   Diagnosis Date    RAND (acute kidney injury) 04/25/2023    Anxiety 2015    [facility]     Arthritis     Biliary dyskinesia     Bipolar disorder 2016    [facility]    Chronic knee pain     Depression 2016    '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, anger issues."     Fatty liver 6/29/2023    GERD (gastroesophageal reflux disease)     History of psychiatric hospitalization 2015    [facility]     Hx of psychiatric care 2015    Buspar, Vistril, Xanax, Paxil    Elaine     Migraines 2014    Primary Care doctor at [facility]     Oppositional defiant disorder 1999    'I spent only one day at [facility] and was discharged."    Panic disorder 2015    [facility]    Psychiatric exam requested by authority 2015    [facility]    Psychiatric problem 2015    Depression and anxiety    Suicide attempt 2015    Interrupted Attempt    Therapy 2016    [facility]       Interdisciplinary Rounds: did not attend  General Information Comments:   Consult received for 34 yo F. New U admit for depression/SI. On lactose restricted diet consuming 100% of most meals and snacks at this time. Meeting ENN. Significant weight loss noted per chart review within  this " "past year. JOEL if intentional or not -- pending response from RN. LBM . Will continue to monitor.  Nutrition Discharge Planning: Pt to dc on general healthful diet with lactose restrictions as needed.    Nutrition Risk Screen    Nutrition Risk Screen: no indicators present    Nutrition/Diet History    Food Allergies: NKFA  Factors Affecting Nutritional Intake: None identified at this time    Anthropometrics    Temp: 97.4 °F (36.3 °C)  Height: 5' 5" (165.1 cm)  Height (inches): 65 in  Weight Method: Standard Scale  Weight: 109.4 kg (241 lb 1.2 oz)  Weight (lb): 241.08 lb  Ideal Body Weight (IBW), Female: 125 lb  % Ideal Body Weight, Female (lb): 195.06 %  BMI (Calculated): 40.1  BMI Grade: greater than 40 - morbid obesity  Weight Loss: other (see comments) (JOEL)  Usual Body Weight (UBW), k.63 kg (48.9148 lb in 1 year per chart review)  Weight Change Amount: 48 lb 14.6 oz  % Usual Body Weight: 83.25  % Weight Change From Usual Weight: -16.93 %       Lab/Procedures/Meds    Pertinent Labs Reviewed: reviewed  Pertinent Labs Comments: glucose: 68 (L), T (H)  Pertinent Medications Reviewed: reviewed  Scheduled:   lumateperone  42 mg Oral QHS    lurasidone  80 mg Oral Daily    nicotine  1 patch Transdermal Daily    traZODone  200 mg Oral QHS    vortioxetine  20 mg Oral Nightly     PRN: acetaminophen, ALPRAZolam, aluminum-magnesium hydroxide-simethicone, benzonatate, benztropine mesylate, hydrOXYzine pamoate, loperamide, OLANZapine **AND** OLANZapine, ondansetron, promethazine    Physical Findings/Assessment         Estimated/Assessed Needs    Weight Used For Calorie Calculations: 109.4 kg (241 lb 2.9 oz)  Energy Calorie Requirements (kcal): 1790  Energy Need Method: P[place]-St Jeor (no AF for obesity)  Protein Requirements: 88 g (0.8 g/kg)  Weight Used For Protein Calculations: 109.4 kg (241 lb 2.9 oz)  Fluid Requirements (mL): 1 mL/kcal  Estimated Fluid Requirement Method: RDA Method (or per MD)  RDA Method " (mL): 1790         Nutrition Prescription Ordered    Current Diet Order: lactose restricted    Evaluation of Received Nutrient/Fluid Intake    I/O: N/A  Energy Calories Required: meeting needs  Protein Required: meeting needs  Fluid Required: meeting needs  Tolerance: tolerating  % Intake of Estimated Energy Needs: 75 - 100 %  % Meal Intake: 75 - 100 %    Nutrition Risk    Level of Risk/Frequency of Follow-up: low (1 x per week)       Monitor and Evaluation    Food and Nutrient Intake: energy intake, food and beverage intake  Food and Nutrient Adminstration: diet order  Knowledge/Beliefs/Attitudes: food and nutrition knowledge/skill, beliefs and attitudes  Physical Activity and Function: nutrition-related ADLs and IADLs, factors affecting access to physical activity  Anthropometric Measurements: height/length, weight, weight change, body mass index  Biochemical Data, Medical Tests and Procedures: electrolyte and renal panel, gastrointestinal profile, glucose/endocrine profile, inflammatory profile, lipid profile  Nutrition-Focused Physical Findings: overall appearance       Nutrition Follow-Up    RD Follow-up?: Yes        Brittani Amor RDN, LDN

## 2024-02-28 NOTE — PLAN OF CARE
"Patient reports feeling "lonely" today, stating that she misses her wife but preferred coming into the hospital where she is safe instead of causing her wife to worry. Reports depression and anxiety. States that she has spoken to her wife and that she is a good support system. Denies SI/HI at this time. Denies hallucinations. Appetite adequate. PRN Vistaril administered to aid with insomnia and anxiety. NADN. Remains calm and cooperative. Safety precautions remain in place.   "

## 2024-02-28 NOTE — PLAN OF CARE
Pt is calm and cooperative.  Denies any S/I or H/I at this time.  Mood continues to improve.  Compliant with treatment.  Interaction appropriate on unit.  Planning for discharge tomorrow.  No acute distress apparent at this time, will continue to monitor.
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
## 2024-02-28 NOTE — PSYCH EVALUATION
[facility] discussed with pt on collateral consent. Pt signed collateral consent for  Maritza Caal/wife [phone] . [facility] attemtped to contact collateral consent to discuss pt admission.  Maritza stated:        Reason for admission- describe what happened?    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 She takes her meds at the same time every night. She has a medical card and the marijuana helps her when she is really in those low moods.  The medication she is on now seem to be working, but this episode  seemed to be worse than in the past.     Prior treatment places and dates-doctor name and location  Reason for prior treatment- same or different  How long has patient had problem(s)?    So for as long we have been together and that is 9 years.   She was at [facility] 2 times and to [facility] 2 times as well. All for the same thing depression/SI. She has been cutting since she was a child  Substance abuse- what , how long, how much, how often?  She does use  the medical marijuana and the gummies daily, but not together.   Legal Issues- Current charges, type of offense, probation or parole?    None to my knowledge    History of suicide attempts- when and what methods, did they require medical attention?    None to my knowledge. She does cut to relieve the pain, but never a suicide attempt.     Alcohol Use-  What preference of alcohol, how much, how long, how often?    Very, very seldom.    History of violence-describe behaviors and triggers       Not in my presence or ever that I know of.  Any guns or weapons in the home? If yes, recommend that these be secured.      The guns are at her moms house and she has no access to them. I will make sharp objects are secured within the home    What is patients baseline  behavior/mood- how well do they know if patient is doing well?    I can tell in her personally because she is more happy than depressed.     What helps the patient stay well?  Internal/external coping strategies ( attending meetings, going to groups, taking medications, spending time with family ( etc)?    Staying on her medications and staying busy.       Discharge plan:    Where will the patient live upon discharge?      She will come back home.  Who else in the home?    Just myself and the dogs.  Will someone be able to pick the patient up when discharged?    I will come to pick her up upon discharge.

## 2024-02-28 NOTE — PROGRESS NOTES
"   02/28/24 3199   [facility] Group Therapy   Group Name Therapeutic Recreation   Specific Interventions Cognitive Stimulation Training   Participation Level Appropriate   Participation Quality Cooperative   Insight/Motivation Improved   Affect/Mood Display Appropriate   Cognition Alert   Psychomotor WNL     Patient presents calm, cooperative, willing to participate, reports a "good" mood, "better than before". Patient participated in a matching skilled activity to boost mental organization, word retrieval and promote enjoyment. Patient remained alert and involved.  "

## 2024-02-29 VITALS
HEIGHT: 65 IN | WEIGHT: 241.06 LBS | RESPIRATION RATE: 18 BRPM | TEMPERATURE: 97 F | BODY MASS INDEX: 40.16 KG/M2 | OXYGEN SATURATION: 96 % | SYSTOLIC BLOOD PRESSURE: 128 MMHG | HEART RATE: 68 BPM | DIASTOLIC BLOOD PRESSURE: 67 MMHG

## 2024-02-29 PROCEDURE — 90833 PSYTX W PT W E/M 30 MIN: CPT | Mod: ,,, | Performed by: PSYCHIATRY & NEUROLOGY

## 2024-02-29 PROCEDURE — S4991 NICOTINE PATCH NONLEGEND: HCPCS | Performed by: PSYCHIATRY & NEUROLOGY

## 2024-02-29 PROCEDURE — 99239 HOSP IP/OBS DSCHRG MGMT >30: CPT | Mod: ,,, | Performed by: PSYCHIATRY & NEUROLOGY

## 2024-02-29 PROCEDURE — 25000003 PHARM REV CODE 250: Performed by: PSYCHIATRY & NEUROLOGY

## 2024-02-29 RX ORDER — IBUPROFEN 200 MG
1 TABLET ORAL DAILY
COMMUNITY
Start: 2024-02-29

## 2024-02-29 NOTE — CARE UPDATE
Keyla Caal MRN:[id number] ([facility]#[id number]) (33 y.o. F) (Adm: 24)  [facility] [id number]  Patient Demographics    Patient Name  Keyla Caal Legal Sex  Female          Age  1990 (33 y.o.) [facility]   Address   [place] [address] Phone  [phone] (Home)  [phone] (Mobile) *Preferred*     Patient Demographics    Address   [place] [address] Phone  [phone] (Home)  [phone] (Mobile) *Preferred* E-mail Address  [email or web address]     PCP and Center    Primary Care Provider  ONEAL Meredith Phone  [phone] Center  [facility]     Patient Contacts    Name Relation Home Work Mobile   Maritza Caal Spouse   [phone]   Deidre Caal Mother   [phone]     Documents on File     Status Date Received Description   Documents for the Patient   Notice of Privacy Pract Ackn Received 13    Insurance Documents Received 13 B/C   Patient ID Received () 13 La Dl    Advance Directive Acknowledgement Received 24    Clinic Authorization Received () 13    Insurance Documents Received 14 New B/C Policy   Clinic Authorization Received () 14 NEEDS   Outside Correspondence  14    Provider Based Acknowledgement      Insurance Documents      Insurance Documents      Outside Correspondence  14    BCBS Coordination of Benefits Received 01/12/15    BCBS Coordination of Benefits Received 01/12/15    Clinic Authorization Received () 02/24/15    Insurance Documents Received 04/20/15 New B/C POLICY   Outside Radiology Documentation Received 16 XRAY KNEE   Outside Radiology Documentation Received 16 MRI OTHER   Outside Progress or Provider Notes Received 16    Authorization or Referral Received () 16 GENERAL   Notice of Privacy Pract Ackn LIZY Signed 17 HIPAA/SELF   Clinic Authorization LIZY      Provider  Based Acknowledgement LIZY      Disclosure Agreement Accepted 17    Patient ID Received () 17 LA DL 10/2017   Advance Beneficiary Notice Not Received     Notice of Privacy Practice [facility] Signed 17 HIPAA/Self   Clinic Authorization LIZY Signed () 17    Insurance Documents Received 10/30/17 c/medicaid   Plain Language Summary English No, Patient Declined Print () 17    Clinic Authorization Signed () 17    Correspondence Received 17 laf par govt DS   Insurance Documents Received 17 Medicaid Website   External Evaluation Received 17 GENERAL   Outside Consultation Note Received 17 GENERAL   Outside Progress or Provider Notes   behavior health notes   Outside Psychiatric Documentation Received 10/14/17    Patient ID Received () 10/26/17 LA DL 10/2023   Involvement In Care      [facility] Disclosure Signed () 10/26/17 PBB/Self   [facility] Provider Based Facility Disclosure Signed () 10/30/17    [facility] Provider Based Facility Disclosure Signed () 17    HIM GEE Authorization Received 17 HIM Auth for ReleaseID [id number]   Outside Emergency Room Note Received 17    [facility] Provider Based Facility Disclosure Signed () 18    Release of Information Received 18    Release of Information Received 18    [facility] Provider Based Facility Disclosure Signed () 18    Clinic Authorization LIZY Signed () 18 consent self   Patient Questionnaire C   Pregnancy Form   Plain Language Summary English Acknowledged () 18 fin asst. info   [facility] Not Contracted Facility Disclosure Signed () 18    Insurance Documents Received 18 medicaid search/ none   [facility] Contracted Facility Disclosure Signed () 18 self   [facility] Not Contracted Facility Disclosure Signed () 18    Release of Information Received 18     Notice of Privacy Practice [facility] Signed 19 HIPAA/self   Outpatient Authorization TG      HIM GEE Authorization Received 20 HIM Auth for ReleaseID [id number]   HIM GEE Authorization Received 20 HIM Auth for ReleaseID [id number]   Clinic Authorization SATYR      Notice of Privacy Practice SATYR      Patient ID Received 20 LA DL 10/15/2024   Notice of Privacy Practice [facility] Signed 21    Bankruptcy Received 21 Keyla Caal (Maritza Caal) [id number]   [facility] Not Contracted Facility Disclosure Signed 21 HBB/SELF   Bankruptcy Received 21 Armani Caal) [id number]   Release of Information Received 21    Clinic Authorization LIZY Signed () 21    Release of Information Received 10/01/21    Bankruptcy Received 10/28/21 Maritza Caal(Keyla Zaidi) [id number]   Bankruptcy Received 10/28/21 Keyla Caal (Maritza Caal) [id number]   [facility] Not Contracted Facility Disclosure Signed 22    Clinic Authorization      HIM GEE Authorization Received 22 HIM Auth for ReleaseID [id number]   Clinic Authorization LIZY Signed () 22 consent   [facility] Contracted Facility Disclosure      Plain Language Summary English      [facility] Not Contracted Facility Disclosure      Patient Rights and Responsibilities Acknowledged 23    Notice of Privacy Pract Antonella MEDEL      Patient Entered Attachment Received 05/15/23 image.jpg   [facility] Facility Balance Billing Disclosure      Patient ID Received 23 LADL EXP    Insurance Documents Received () 23 MEDICARE A & B   Insurance Documents Received () 23 [employer] MED[employer]   Notice of Privacy Practice [facility] Signed 23    [facility] Facility Balance Billing Disclosure Signed 23    Outpatient Authorization TG Signed 23    Plain Language Summary English Acknowledged () 24    [facility] Not Contracted Facility Disclosure Signed  24    Notice of Privacy Tawana Berman      Patient Photo   Photo of Patient   [facility] Contracted Facility Disclosure Signed (Deleted) 18    CE Prospective Auth Received () 10/25/19 Authorization Document from Social Security Administration   System-Retrieved CE Auth Form Received () 10/25/19 External Authorization Form from Social Security Administration   HIM Release of Information Output  (Deleted) 20 Requested records   CE Prospective Auth Received () 20 Authorization Document from Social Security Administration   System-Retrieved CE Auth Form Received () 20 External Authorization Form from Social Security Administration   CE Prospective Auth Received () 20 Authorization Document from Social Security Administration   System-Retrieved CE Auth Form Received () 20 External Authorization Form from Social Security Administration   HIM Release of Information Output  (Deleted) 22    HIM Release of Information Output  (Deleted) 22 Requested records   HIM Release of Information Output  (Deleted) 23    Documents for the Encounter   Medicare Lifetime Reserve Form      Important Medicare Message Printed at Discharge      Advance Beneficiary Notice      Hospital Authorization Signed 24    Hospital Authorization      Flowsheets Nursing Received 24    Flowsheets Nursing Received 24    Clinical References Attachment   Depression Discharge Instructions, Adult (English)   Clinical References Attachment   Suicide Prevention (English)   Clinical References Attachment   Tips on Positive Thinking (English)   Clinical References Attachment   Quitting Smoking ED (English)     Admission Information    Current Information    Attending Provider Admitting Provider Admission Type Admission Status   Tao Elizabeth MD Blanchard, Michael J., MD Elective Confirmed Admission          Admission Date/Time Discharge Date Hospital  Service Auth/Cert Status   02/26/24 2025  Psychiatry Incomplete          Hospital Area Unit Room/Bed    [facility] BEHAVIORAL HE[facility] UNIT 209/209            Discharge Disposition Discharge Destination   Home or Self Care      Admission    Complaint        Hospital Account    Name Acct ID Class Status Primary Coverage   Keyla Caal [id number] IP- Psych Open [employer] [facility] - [employer]          Guarantor Account (for Hospital Account #[id number])    Name Relation to Pt Service Area Active? Acct Type   Keyla Caal Self [facility] Yes Behavioral Health   Address Phone     [address] [address] [phone](H)            Coverage Information (for Hospital Account #[id number])    1. [employer] [facility]/[employer]    F/O Payor/Plan Precert #   [employer] [facility]/[employer]    Subscriber Subscriber #   Keyla Caal [id number]   Address Phone   [address] [address]  [place] [address] [phone]   2. [employer] ([facility])    F/O Payor/Plan Precert #   [employer]/[facility] ([facility])    Subscriber Subscriber #   Keyla Caal [id number]   Address Phone   [address] [address]  [place] [address] [phone]

## 2024-02-29 NOTE — PLAN OF CARE
"Patient reports feeling "good" today, spending majority of time in activity room and room. Interacting appropriately with staff. Verbalizing readiness for discharge. Denies SI/HI at this time. Denies hallucinations. Appetite adequate. PRN Vistaril administered to aid with insomnia and anxiety. NADN. Remains calm and cooperative. Safety precautions remain in place.  "

## 2024-02-29 NOTE — PROGRESS NOTES
"   02/29/24 1010   [facility] Group Therapy   Group Name Therapeutic Recreation   Specific Interventions Cognitive Stimulation Training   Participation Level Appropriate;Sharing   Participation Quality Cooperative   Insight/Motivation Improved;Applies New Skills   Affect/Mood Display Appropriate   Cognition Alert   Psychomotor WNL     Patient presents motivated, calm, cooperative, reports a "good" mood. Patient participated in a brain teaser to boost brain power, keep memory strong and promote enjoyment. Patient identified coping skill of "call somebody if I feel like cutting."  "
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
## 2024-02-29 NOTE — PROGRESS NOTES
Psychotherapy:  Target symptoms: depression, anxiety   Why chosen therapy is appropriate versus another modality: relevant to diagnosis, patient responds to this modality, evidence based practice  Outcome monitoring methods: self-report, observation  Therapeutic intervention type: insight oriented psychotherapy, behavior modifying psychotherapy, supportive psychotherapy, interactive psychotherapy  Topics discussed/themes: building skills sets for symptom management, symptom recognition  Safety planning and wrap up session  The patient's response to the intervention is accepting. The patient's progress toward treatment goals is fair.   Duration of intervention: 18 minutes.    Tao Elizabeth MD  Psychiatry

## 2024-02-29 NOTE — PLAN OF CARE
Lying quietly in bed, eyes closed, respirations even, unlabored. Apparently asleep. Slept 8.5 hours thus far with one interruption. Safety precautions maintained. Rounds done every 15 minutes. Bed is fixed in low position and room is uncluttered and pathways are clear.

## 2024-03-01 NOTE — PSYCH
The patient has transitioned to out patient treatment ,  [facility] , [address] [place] [id number]. [phone]. An appointment has been scheduled on 3/04/2024 at 12:45 p. While being treated the patient will receive smoking cessation, medication management and counseling. The AVS was faxed  3/1/2024 at 9;30AM.

## 2024-03-01 NOTE — PSYCH
"DISCHARGE INSTRUCTIONS  Keyla Caal MRN: [id number]     Bipolar disorder   2/26/2024 - 2/29/2024   [facility]   Your Next Steps (Patient should check each box as tasks are completed)   Do       these medications from any pharmacy  nicotine   Read      Read these attachments  Depression Discharge Instructions, Adult (English)  Suicide Prevention (English)  Tips on Positive Thinking (English)  Quitting Smoking ED (English)   Go    MAY    28 Established Patient Visit 1:20 PM  [facility]  [address]  [facility] [id number] [phone]  Please arrive approximately 15 minutes before your scheduled appointment time and ensure that you have a valid government issued ID and your insurance card. ePre-Check is available and completion prior to your arrival will assist with a quicker registration process.     Two Options to Check-In for Your Appointment     With Mobile Check-In simply complete ePre-Check before your appointment and click "I'm Here" in the williams when you park  Or, visit the registration desk to check-in for your appointment     Instructions    Your medications have changed   START taking:  nicotine (NICODERM CQ)   Review your updated medication list below.  What's Next  What's Next         Go to [facility]  as scheduled  on 3/4/2024 at 12:45p, for medication management [address] [place] [address]  [facility] [address]  [phone]   MAY    28 Established Patient Visit  Tuesday May 28, 2024 1:20 PM  Please arrive approximately 15 minutes before your scheduled appointment time and ensure that you have a valid government issued ID and your insurance card. ePre-Check is available and completion prior to your arrival will assist with a quicker registration process.     Two Options to Check-In for Your Appointment     With Mobile Check-In simply complete ePre-Check before your appointment and click "I'm Here" in the williams when you park  Or, visit the registration desk to check-in " for your appointment  Prepay due: $26.46  Please park in the front parking area and enter the clinic at the front door. [facility]  [address]  [place] [address] [phone]     All Component Based Labs   02/26/24  1526  02/26/24  1524  02/26/24  1500     Benzodiazepines Presumptive Positive Abnormal       Methadone metabolites Negative      Phencyclidine Negative      Acetaminophen Level  <3.0 Low      Albumin  3.7     Alcohol, Serum  <10     ALP  59     ALT  15     Amphetamines, Urine Negative      Anion Gap  7 Low      Appearance, UA Clear      AST  14     Barbituates, Urine Negative      Baso #  0.04     Basophil %  0.3     Bilirubin (UA) Negative      BILIRUBIN TOTAL  0.2     BUN  16     Calcium  9.3     Chloride  103     Cholesterol Total   177    CO2  28     Cocaine, Urine Negative      Color, UA Yellow      Creatinine  0.8     Urine Creatinine 29.2      Differential Method  Automated     eGFR  >60     Eos #  0.2     Eos %  1.3     Estimated Avg Glucose   100    Glucose  68 Low      Glucose, UA Negative      Gran # (ANC)  7.6     Gran %  65.4     HDL   38 Low     HDL/Cholesterol Ratio   21.5    Hematocrit  43.2     Hemoglobin  14.1     Hemoglobin A1C External   5.1    Immature Grans (Abs)  0.03     Immature Granulocytes  0.3     Ketones, UA Negative      LDL Cholesterol   92.8    Leukocyte Esterase, UA Negative      Lymph #  3.1     Lymph %  26.4     MCH  29.9     MCHC  32.6     MCV  92     Mono #  0.7     Mono %  6.3     MPV  10.4     NITRITE UA Negative      Non-HDL Cholesterol   139    nRBC  0     Blood, UA Negative      Opiates, Urine Negative      pH, UA 6.0      Platelet Count  376     Potassium  4.0     hCG Qualitative, Urine Negative      PROTEIN TOTAL  7.3     Protein, UA Negative      RBC  4.71     RDW  12.9     Sodium  138     Specific Gravity, UA 1.015      Specimen UA Urine, Clean Catch      Marijuana (THC) Metabolite Presumptive Positive Abnormal       Total Cholesterol/HDL  "Ratio   4.7    Toxicology Information SEE COMMENT      Triglycerides   231 High     TSH  0.438     UROBILINOGEN UA Negative      WBC  11.68                   Your care is important to us. If your provider recommended a follow-up appointment or test, we are happy to help you coordinate your recommended care. It is important that you complete your recommended follow-up.  If you need help scheduling, please call [phone]. Appointments can also be made online through the patient portal.      While scheduling and attending your appointments is your responsibility, our goal is to support and empower you throughout that process.         Your Diagnoses at Discharge   Bipolar disorder  Reason for Admission  patient to [facility] for depression and suicidal ideations with a plan to use a razor to her throat for several days. She has been cutting herself and has superficial cuts to arm. She has been having relationship stressors  You are allergic to the following  You are allergic to the following  No active allergies     Your Latest Vitals    Blood Pressure 128/67       BMI 40.12       Weight 241 lb 1.2 oz       Height 5' 5"       Temperature (Temporal) 97 °F       Pulse 68       Respiration 18       Oxygen Saturation 96%       BSA 2.24 m²  Treatment Team  Chat With All Treatment Team   Provider Role Specialty    Tao Elizabeth MD  Admitting Psychiatry     Recent Lab Values   Include labs without results  Most Recent Result    A1C  5.1  02/26 1500  Most Recent 8 Results  Show dates as rows  Blood Glucose and Lipid Panel Labs   Include labs without results  Most Recent Result from Past 365 Days    Cholesterol  177  02/26 1500  Triglycerides  231  02/26 1500  HDL Cholesterol  38  02/26 1500  LDL Cholesterol  92.8  02/26 1500  HDL/Cholesterol Ratio  21.5  02/26 1500  Total Cholesterol/HDL Ratio  4.7  02/26 1500  Non-HDL Cholesterol  139  02/26 1500  Most Recent 8 Results  Show dates as rows  Pending Labs/Studies  You have " "no pending labs/studies.  Contact Information  Call [phone] (anytime, 7 days a week) to:  Report concerns regarding hospital stay  Ask questions about your hospital stay and home care plan  Get new or updated results of your lab tests and other procedures  [facility]  [facility] Nurse Care Line - 24/7 Assistance  Unless otherwise directed by your provider, please contact [facility], our nurse care line that is available for 24/7 assistance. Please refer to the Patient Instructions section of your After Visit Summary for specific instructions from your physician.     Registered nurses in the [facility] provide appointment scheduling, clinical advisement, health education, and other advisory services.  Call: [phone] (toll free).  Advance Directives  An advance directive is a document which, in the event you are no longer able to make decisions for yourself, tells your healthcare team what kind of treatment you do or do not want to receive, or who you would like to make those decisions for you.  If you do not currently have an advance directive, [facility] encourages you to create one.  For more information call:  [phone]), [phone] ([phone]),  or log on to [email or web address].  Advance Directive Status  Flowsheet Row Most Recent Value   Advance Directive Status Patient does not have Advance Directive, declines information.   Advance Directive Type Both psychiatric and medical Advance Directives   Reason you declined to provide an Advance Directive Do not feel it is needed     Behavioral Health Safety Plan          Step 1: Warning Signs:     Pt stated"I get frustrated."     Pt stated"I get irritated."     Pt stated"I cry alot."       Step 2: Internal coping strategies - Things I can do to take my mind off my problems without contacting another person:     Pt stated" I walk away."     Pt stated" I talk to my wife."     Pt stated" I take deep breaths." " "      Step 3: People and social settings that provide distraction:     Name: Maritza Caal/wife Phone: In cell phone     Name: Deidre Caal/mother Phone: In cell phone     Place: Pt stated" I talk to at home or on the phone."     Place: Pt stated" I talk to her at her home or on the phone."       Step 4: People whom I can ask for help:     Name: Purnima/friend Phone: In cell phone     Name: Brittany/friend Phone: In cell phone     Name: Maritza/wife Phone: In cell phone       Step 5: Professionals or agencies I can contact during a crisis:     Clinician Name: [facility] Phone: [phone]     Clinician Pager or Emergency Contact #: [phone]           Clinician Name: [facility] Phone: [phone]     Clinician Pager or Emergency Contact #: [phone]           Suicide Prevention Lifeline: 988 or 6-660-195-GZEG (7743)           Local Emergency Service: [facility]     Emergency Services Address: [address] [place] [address] [phone]     Emergency Services Phone: [phone] [place] Crisis [place], [place], [place] Crisis and [place] Hotline       Step 6: Making the environment safer:     Pt stated" I really do not know."   2. Pt stated" My wife and my dogs."         Used with permission from [employer]MAR [employer] & KATLYN Aguilar. (2011). Safety planning intervention: A brief intervention to mitigate suicide risk. [facility]. 19, 256-264            Smoking Cessation  If you would like to quit smoking:  You may be eligible for free services if you are a [place] or [place] resident Call [facility] at [phone].  Contact us via email: [email or web address]  View our website for more information: [email or web address]  Language Assistance Services  ATTENTION: Language assistance services are available, free of charge. Please call [phone].       ATENCIÓN: Si habla español, tiene a manzanares disposición " servicios gratuitos de asistencia lingüística. [address] [phone].     [facility] Ý: N?u b?n nói Ti?ng Vi?t, có các d?ch v? h? tr? ngôn ng? mi?n phí dành cho b?n. G?i s? [phone].  National Suicide Prevention Lifeline  If you or someone you know is thinking about suicide, call the National Suicide Prevention Lifeline using the 3 digit phone number of 537 or 7-864-218-PKWS (6858).  The lifeline is free, confidential and always available.  Help a loved one, a friend or yourself.  www.suicidepreventionlifeline.org     Medication list    START taking these medications  START taking these medications    Additional info         nicotine 14 mg/24 hr  Commonly known as: NICODERM CQ  Refills: 0  Dose: 1 patch Place 1 patch onto the skin once daily. Begin Date AM Noon PM Bedtime     CONTINUE taking these medications  CONTINUE taking these medications    Additional info         albuterol 90 mcg/actuation inhaler  Commonly known as: VENTOLIN HFA  Quantity: 8 g  Refills: 0  Dose: 2 puff Inhale 2 puffs into the lungs every 6 (six) hours as needed for Wheezing. Rescue Begin Date AM Noon PM Bedtime    ALPRAZolam 1 MG tablet  Commonly known as: XANAX  Quantity: 30 tablet  Refills: 0  Dose: 1 mg Take 1 tablet (1 mg total) by mouth 2 (two) times daily as needed for Anxiety.  Doctor's comments: Refill provided while provider out with [place] Begin Date AM Noon PM Bedtime    budesonide-formoterol 160-4.5 mcg 160-4.5 mcg/actuation Hfaa  Commonly known as: SYMBICORT  Quantity: 6 g  Refills: 0  Dose: 2 puff Inhale 2 puffs into the lungs every 12 (twelve) hours. Controller Begin Date AM Noon PM Bedtime    CAPLYTA 42 mg Cap  Refills: 0  Dose: 42 mg  Generic drug: lumateperone Take 42 mg by mouth. Begin Date AM Noon PM Bedtime    lurasidone 80 mg Tab tablet  Commonly known as: LATUDA  Refills: 0 1 tablet with food Begin Date AM Noon PM Bedtime    metoclopramide HCl 5 MG tablet  Commonly known as: REGLAN  Quantity: 90  tablet  Refills: 0  Dose: 5 mg Take 1 tablet (5 mg total) by mouth 3 (three) times daily before meals. Begin Date AM Noon PM Bedtime    nystatin ointment  Commonly known as: MYCOSTATIN  Quantity: 15 g  Refills: 1 Apply topically 2 (two) times daily. for 7 days Begin Date AM Noon PM Bedtime    omeprazole 20 MG capsule  Commonly known as: PRILOSEC  Refills: 0  Dose: 20 mg Take 20 mg by mouth 2 (two) times daily. Begin Date AM Noon PM Bedtime    ondansetron 4 MG Tbdl  Commonly known as: ZOFRAN-ODT  Quantity: 120 tablet  Refills: 3  Dose: 8 mg Take 2 tablets (8 mg total) by mouth 2 (two) times daily. Begin Date AM Noon PM Bedtime    temazepam 15 mg Cap  Commonly known as: RESTORIL  Refills: 0  Dose: 15 mg Take 15 mg by mouth nightly as needed. Begin Date AM Noon PM Bedtime    traZODone 100 MG tablet  Commonly known as: DESYREL  Refills: 0  Dose: 300 mg Take 300 mg by mouth every evening. Begin Date AM Noon PM Bedtime    vortioxetine 20 mg Tab  Commonly known as: TRINTELLIX  Refills: 0

## 2024-07-31 ENCOUNTER — CLINICAL SUPPORT (OUTPATIENT)
Dept: REHABILITATION | Facility: HOSPITAL | Age: 34
End: 2024-07-31
Payer: MEDICARE

## 2024-07-31 DIAGNOSIS — M62.838 MUSCLE SPASM: Primary | ICD-10-CM

## 2024-07-31 DIAGNOSIS — R10.2 PELVIC PAIN: ICD-10-CM

## 2024-07-31 DIAGNOSIS — R27.8 COORDINATION ABNORMAL: ICD-10-CM

## 2024-07-31 PROCEDURE — 97530 THERAPEUTIC ACTIVITIES: CPT | Mod: PN | Performed by: PHYSICAL THERAPIST

## 2024-07-31 PROCEDURE — 97162 PT EVAL MOD COMPLEX 30 MIN: CPT | Mod: PN | Performed by: PHYSICAL THERAPIST

## 2024-07-31 NOTE — PLAN OF CARE
"[facility]   Physical Therapy Initial Evaluation     Date: 7/31/2024   Name: Keyla Caal  Clinic Number: [id number]    Therapy Diagnosis:   Encounter Diagnoses   Name Primary?    Pelvic pain     Muscle spasm Yes    Coordination abnormal      Physician: Tiny Thibodeaux MD    Physician Orders: PT Eval and Treat PF PT  Medical Diagnosis from Referral:   Evaluation Date: 7/31/2024R10.2 (ICD-10-CM) - Pelvic pain  Authorization Period Expiration: 5/28/2025  Plan of Care Expiration: 10/23/2024  Progress Note Due: 8/28/2024  Visit # 1/12 Visits authorized: 1/1   FOTO: 1/3    Precautions: Standard     Time In: 7:33 AM   Time Out: 8:28 AM   Total Appointment Time (timed & untimed codes): 55 minutes    HISTORY      Keyla is a 33 y.o. female evaluated on 07/31/2024    Physician:  Tiny Thibodeaux MD   Diagnosis:   Encounter Diagnoses   Name Primary?    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, anger issues."     Fatty liver 6/29/2023    GERD (gastroesophageal reflux disease)     History of psychiatric hospitalization 2015    [facility]     Hx of psychiatric care 2015    Buspar, Vistril, Xanax, Paxil    [place]     Migraines 2014    Primary Care doctor at [facility]     Oppositional defiant disorder 1999    'I spent only one day at [facility] and was discharged."    Panic disorder 2015    [facility]    Psychiatric exam requested by authority 2015    [facility]    Psychiatric problem 2015    Depression and anxiety    Suicide attempt 2015    " Interrupted Attempt    Therapy 2016    [facility]      Surgical History:   Past Surgical History:   Procedure Laterality Date    BARTHOLIN GLAND CYST EXCISION      BLADDER SUSPENSION      ESOPHAGOGASTRODUODENOSCOPY N/A 3/15/2023    Procedure: EGD (ESOPHAGOGASTRODUODENOSCOPY);  Surgeon: Nena Deras MD;  Location: [facility];  Service: Endoscopy;  Laterality: N/A;    KNEE SURGERY      right    LAPAROSCOPIC CHOLECYSTECTOMY N/A 02/03/2022    Procedure: CHOLECYSTECTOMY, LAPAROSCOPIC;  Surgeon: Ramy Mckay MD;  [facility];  Service: General;  Laterality: N/A;    WISDOM TOOTH EXTRACTION        Medications:   Current Outpatient Medications   Medication Sig    albuterol (VENTOLIN HFA) 90 mcg/actuation inhaler Inhale 2 puffs into the lungs every 6 (six) hours as needed for Wheezing. Rescue    ALPRAZolam (XANAX) 1 MG tablet Take 1 tablet (1 mg total) by mouth 2 (two) times daily as needed for Anxiety.    budesonide-formoterol 160-4.5 mcg (SYMBICORT) 160-4.5 mcg/actuation HFAA Inhale 2 puffs into the lungs every 12 (twelve) hours. Controller    LAMICTAL 25 mg tablet     lumateperone (CAPLYTA) 42 mg Cap Take 42 mg by mouth.    lurasidone (LATUDA) 80 mg Tab tablet 1 tablet with food    metoclopramide HCl (REGLAN) 5 MG tablet Take 1 tablet (5 mg total) by mouth 3 (three) times daily before meals.    nicotine (NICODERM CQ) 14 mg/24 hr Place 1 patch onto the skin once daily.    nystatin (MYCOSTATIN) ointment Apply topically 2 (two) times daily. for 7 days    omeprazole (PRILOSEC) 20 MG capsule Take 20 mg by mouth 2 (two) times daily.    ondansetron (ZOFRAN-ODT) 4 MG TbDL Take 2 tablets (8 mg total) by mouth 2 (two) times daily.    temazepam (RESTORIL) 15 mg Cap Take 15 mg by mouth nightly as needed.    traZODone (DESYREL) 100 MG tablet Take 300 mg by mouth every evening.    vortioxetine (TRINTELLIX) 20 mg Tab Take by mouth.     No current facility-administered medications for this visit.      "  Allergies:   Review of patient's allergies indicates:  No Known Allergies     Precautions: universal    OB/GYN History:  none, IUD due to heavy bleeding    Bladder/Bowel History: childhood bladder problems, trouble feeling bladder urge/fullness, dribbling after urination, trouble emptying bladder completely, recurrent bladder infections, urinary incontinence, constipation/straining for movement, and trouble feeling bowel urge/fullness      SUBJECTIVE     Date of onset: about 15 years ago - had surgery when she was in her 20s     History of current complaint: Patient states that she had surgery in her 20s for Bartholin's cyst. She states she has had pain with arousal and with tension/anxiety. States that is painful with arousal. On a typical day it is not necessarily pain, but awareness. States that she will leak urine if she coughs or sneezes.     Had cysts that would cover her vaginal opening - repeatedly had it lanced then had surgery. Denies sexual abuse.     Patient's goals for therapy: improve pain and bowel and bladder habits.     Pain: Patient reports "I just know it is there"/10, with 0 being the lowest and 10 being the highest.    Activities that cause symptoms: with cough/sneeze/laugh/yell, sexual activity, prolonged sitting, and tampon insertion - were itchy in the past thought she was allergic     Previous treatment included none     Sexually active? Yes - same female partner for 9 years. She has been  for 7 years.     Frequency of urination:   Daytime: more frequent when she is nervous - urgency of urine           Nighttime: 0 unless she is cold, has wet the bed as a adult    Difficulty initiating urine stream: No  Urine stream: strong and interrupted  Complete emptying: No  Bladder leakage: Yes  Frequency of incidents: daily - changes underwear   Amount leaked (urine): drops and teaspoons    Frequency of bowel movements: 2 times a day significant urgency  Difficulty initiating BM: " Yes  Quality/Shape of BM: [place] Stool Chart 1-7 , small pieces of stool vs. Loose stool   Colon leakage: Yes with urgency   Frequency of incidents: 1x a week    Amount leaked (bowels): small amount and full movement  Form of protection: none   Number of pads required in 24 hours: none - changes clothes     Types of fluid intake: only drinks water when she is sweating. Drinks Diet Soda.   Diet: does low calorie  Current exercise: Enjoys fishing, swimming, walking, being outside     Occupation: Pt does not work due to mental disability Past jobs include ship yard, gas station  .    OBJECTIVE     ORTHO SCREEN  Posture: flexed posturing  Pelvic alignment: not assessed    ABDOMINALS - not assessed    VAGINAL PELVIC FLOOR EXAM - to be performed next visit    TREATMENT    Crystal participated in dynamic functional therapeutic activities to improve functional performance for 10  minutes, including:  Educated on pelvic anatomy and function of the pelvic floor.      Education: instructed on general anatomy/physiology of urinary/bowel system; discussed plan of care with patient and parent/guardian; instructed in benefits/risks of treatment; instructed in alternative methods of treatment; instructed in risks of refusing treatment; patient a parent agreed to treatment plan.     Also educated in: anatomy/physiology of pelvic floor, posture/body mechanices, fluid intake/dietary modifications, and behavior modifications    ASSESSMENT      This is a 33 y.o. female referred to outpatient physical therapy and presents with a medical diagnosis of R10.2 (ICD-10-CM) - Pelvic pain. Patient will benefit from skilled physical therapy to improve pelvic pain and bowel and bladder habits.    Educational/Spiritual/Cultural needs: none  Abuse/Neglect: no signs  Nutritional Status: WDWN   Fall Risk: patient is not a fall risk    Pt's spiritual, cultural and educational needs considered and pt agreeable to plan of care and goals as stated below:      Medical Necessity is demonstrated by the following:  History  Co-morbidities and personal factors that may impact the plan of care [] LOW: no personal factors / co-morbidities  [x] MODERATE: 1-2 personal factors / co-morbidities  [] HIGH: 3+ personal factors / co-morbidities    Moderate / High Support Documentation:   Co-morbidities affecting plan of care: none    Personal Factors:   no deficits     Examination  Body Structures and Functions, activity limitations and participation restrictions that may impact the plan of care [] LOW: addressing 1-2 elements  [x] MODERATE: 3+ elements  [] HIGH: 4+ elements (please support below)    Moderate / High Support Documentation:      Clinical Presentation [] LOW: stable  [x] MODERATE: Evolving  [] HIGH: Unstable     Decision Making/ Complexity Score: low           PLAN    Frequency: 1x per week  Duration: 12 weeks    Short Term Goals: 6 weeks   Patient will deny leakage of urine.  Patient will be independent with pelvic floor relaxation to improve bowel and bladder evacuation.   Patient will be able to demonstrate improved pelvic floor relaxation and contraction on rehabilitative ultrasound.   Patient will report feeling the urge to urinate.   Patient will report improved water intake.     Long Term Goals: 12 weeks   Patient will report urinating every 3-4 hours with strong urine stream.   Patient will deny nocturia.   Patient will deny pelvic pain to allow for intimacy with her wife.   Patient will demonstrate adequate pelvic floor coordination with contraction and relaxation on sEMG vs rehabilitative ultrasound.      Physical therapy will include: therapeutic exercise, manual therapy, therapeutic activities, neuromuscular re-education, manual therapy, modalities PRN, gait training, and self-care education.       Therapist: Raf Hawley PT  Board-certified Women's Health Clinical Specialist  7/31/2024

## 2024-08-08 ENCOUNTER — CLINICAL SUPPORT (OUTPATIENT)
Dept: REHABILITATION | Facility: HOSPITAL | Age: 34
End: 2024-08-08
Payer: MEDICARE

## 2024-08-08 DIAGNOSIS — M62.838 MUSCLE SPASM: ICD-10-CM

## 2024-08-08 DIAGNOSIS — R27.8 COORDINATION ABNORMAL: ICD-10-CM

## 2024-08-08 DIAGNOSIS — R10.2 PELVIC PAIN: Primary | ICD-10-CM

## 2024-08-08 PROCEDURE — 97140 MANUAL THERAPY 1/> REGIONS: CPT | Mod: PN | Performed by: PHYSICAL THERAPIST

## 2024-08-08 PROCEDURE — 97530 THERAPEUTIC ACTIVITIES: CPT | Mod: PN | Performed by: PHYSICAL THERAPIST

## 2024-09-20 ENCOUNTER — CLINICAL SUPPORT (OUTPATIENT)
Dept: REHABILITATION | Facility: HOSPITAL | Age: 34
End: 2024-09-20
Payer: MEDICARE

## 2024-09-20 DIAGNOSIS — M62.838 MUSCLE SPASM: ICD-10-CM

## 2024-09-20 DIAGNOSIS — R10.2 PELVIC PAIN: Primary | ICD-10-CM

## 2024-09-20 DIAGNOSIS — R27.8 COORDINATION ABNORMAL: ICD-10-CM

## 2024-09-20 PROCEDURE — 97112 NEUROMUSCULAR REEDUCATION: CPT | Mod: PN | Performed by: PHYSICAL THERAPIST

## 2024-09-20 PROCEDURE — 97140 MANUAL THERAPY 1/> REGIONS: CPT | Mod: PN | Performed by: PHYSICAL THERAPIST

## 2024-09-20 NOTE — PROGRESS NOTES
Pelvic Health Physical Therapy   Treatment Note and Progress Note     Name: Keyla Caal  Clinic Number: [id number]    Therapy Diagnosis:   Encounter Diagnoses   Name Primary?    Pelvic pain Yes    Muscle spasm     Coordination abnormal      Physician: Tiny Thibodeaux MD    Visit Date: 9/20/2024    Physician Orders: PT Eval and Treat PF PT  Medical Diagnosis from Referral:   Evaluation Date: 7/31/2024R10.2 (ICD-10-CM) - Pelvic pain  Authorization Period Expiration: 5/28/2025  Plan of Care Expiration: 10/23/2024  Progress Note Due: 10/20/2024  Visit # 3/12 Visits authorized: 2/20   FOTO: 1/3    Cancelled Visits: 0  No Show Visits: 0    Time In: 9:05 AM   Time Out: 9:50 AM  Total Billable Time: 45 minutes    Precautions: Standard    Subjective     Pt reports: States that her bowels take her forever to push it out. Leaking stool when she thinks she has to have a bowel movement. Leaking urine with straining.     She was compliant with home exercise program.  Response to previous treatment: none  Functional change: none    Pain in:  2/10  Pain out: 0/10  Location: pelvis        Objective   Keyla participated in dynamic functional therapeutic activities to improve functional performance for 15 minutes, including:  Provided education on the relationship between anxiety/stress and muscle tension. Educated on the function of the pelvic floor with bladder control and the importance of PF mobility. She does report to me that she has been cutting her leg out of self harm. She has been seeing her counselor regularly due to this. I also discussed with her the crisis assist line and to call for assistance if needed. She voiced understanding.     Keyla participated in neuromuscular re-education activities to develop Coordination, Control, Down training, and Proprioception for 30 minutes including: pelvic floor downtraining with assist of RUSI  and rehabilitative ultrasound imaging to help visualize pelvic floor muscle  contraction and relaxation with kegels  PF down training with cues for proper mechanics - pt with muscle tension instead of mobility. Able to correct at times.           Intervention Eval  08/08/2024 09/20/2024    Neuro Re-Ed Rehabilitative ultrasound     pelvic floor downtraining with assist of RUSI  and rehabilitative ultrasound imaging to help visualize pelvic floor muscle contraction and relaxation with kegels  PF down training with cues for proper mechanics - pt with muscle tension instead of mobility. Able to correct at times.     Manual Ther Vaginal exam    pain impaired mobility    TherAct       Provided education on sleep habits and breathing mechanics.     Provided education on the relationship between anxiety/stress and muscle tension. Educated on the function of the pelvic floor with bladder control and the importance of PF mobility. She does report to me that she has been cutting her leg out of self harm. She has been seeing her counselor regularly due to this. I also discussed with her the crisis assist line and to call for assistance if needed. She voiced understanding.        Home Exercises Provided and Patient Education Provided     Education provided:   - anatomy/physiology of pelvic floor, posture/body mechanices, and proper bearing down techniques  Discussed progression of plan of care with patient; educated pt in activity modification; reviewed HEP with pt. Pt demonstrated and verbalized understanding of all instruction and was provided with a handout of HEP (see Patient Instructions).    Written Home Exercises Provided: yes.  Exercises were reviewed and Keyla was able to demonstrate them prior to the end of the session.  Keyla demonstrated good  understanding of the education provided.     See EMR under Patient Instructions for exercises provided 09/20/2024 .    Assessment     Good understanding of physical therapist recommendations. Willing to comply.     Keyla Is progressing well  towards her goals.   Pt prognosis is Good.     Pt will continue to benefit from skilled outpatient physical therapy to address the deficits listed in the problem list box on initial evaluation, provide pt/family education and to maximize pt's level of independence in the home and community environment.     Pt's spiritual, cultural and educational needs considered and pt agreeable to plan of care and goals.     Anticipated barriers to physical therapy: none    Short Term Goals: 6 weeks   Patient will deny leakage of urine. Goal not met - progressing   Patient will be independent with pelvic floor relaxation to improve bowel and bladder evacuation. Goal not met - progressing   Patient will be able to demonstrate improved pelvic floor relaxation and contraction on rehabilitative ultrasound. Goal not met - progressing   Patient will report feeling the urge to urinate. Goal not met - progressing   Patient will report improved water intake. Goal not met - progressing      Long Term Goals: 12 weeks   Patient will report urinating every 3-4 hours with strong urine stream. Goal not met - progressing   Patient will deny nocturia. Goal not met - progressing   Patient will deny pelvic pain to allow for intimacy with her wife. Goal not met - progressing   Patient will demonstrate adequate pelvic floor coordination with contraction and relaxation on sEMG vs rehabilitative ultrasound.  Goal not met - progressing     Plan     Continue with progressive mobility of the pelvic floor and desensitization.     Raf Hawley, PT

## 2024-09-27 ENCOUNTER — CLINICAL SUPPORT (OUTPATIENT)
Dept: REHABILITATION | Facility: HOSPITAL | Age: 34
End: 2024-09-27
Payer: MEDICARE

## 2024-09-27 DIAGNOSIS — R10.2 PELVIC PAIN: Primary | ICD-10-CM

## 2024-09-27 DIAGNOSIS — M62.838 MUSCLE SPASM: ICD-10-CM

## 2024-09-27 DIAGNOSIS — R27.8 COORDINATION ABNORMAL: ICD-10-CM

## 2024-09-27 PROCEDURE — 97112 NEUROMUSCULAR REEDUCATION: CPT | Mod: PN | Performed by: PHYSICAL THERAPIST

## 2024-09-27 PROCEDURE — 97530 THERAPEUTIC ACTIVITIES: CPT | Mod: PN | Performed by: PHYSICAL THERAPIST

## 2024-09-27 NOTE — PROGRESS NOTES
"  Pelvic Health Physical Therapy   Treatment Note      Name: Keyla Caal  Clinic Number: [id number]    Therapy Diagnosis:   Encounter Diagnoses   Name Primary?    Pelvic pain Yes    Muscle spasm     Coordination abnormal      Physician: Tiny Thibodeaux MD    Visit Date: 9/27/2024    Physician Orders: PT Eval and Treat PF PT  Medical Diagnosis from Referral:   Evaluation Date: 7/31/2024R10.2 (ICD-10-CM) - Pelvic pain  Authorization Period Expiration: 5/28/2025  Plan of Care Expiration: 10/23/2024  Progress Note Due: 10/20/2024  Visit # 4/12 Visits authorized: 3/20   FOTO: 1/3    Cancelled Visits: 0  No Show Visits: 0    Time In: 9:00 AM   Time Out: 9:38 AM    Total Billable Time: 38 minutes    Precautions: Standard    Subjective     Pt reports: patient complaints of bilateral knee pain. Patient states she fell yesterday - right leg when back left leg went forward. She was trying to open a door while running and bounced back and went down. "I did it to myself. I thought I was young." Still with constipation. Peeing a lot and feels like it is dripping. When she has to go to the bathroom it is leaking on her underwear and has to change her underwear 2-3 times a day. Tried pads, but it is irritating her skin. Feels like a change in meds affected her bowel evacuation.     She was compliant with home exercise program.  Response to previous treatment: none  Functional change: none    Pain in:  0/10  Pain out: 0/10  Location: pelvis        Objective   Keyla participated in dynamic functional therapeutic activities to improve functional performance for 28 minutes, including: provided education on home perineal desensitization to tolerance. Many have to scale back and start with visual imagery prior to vaginal penetration. See education provided.     Keyla participated in neuromuscular re-education activities to develop Coordination, Control, Down training, and Proprioception for 10 minutes including: Encouraged " body awareness and PF mobility with breathing and self vaginal penetration. Encouraged a positive experience and not tolerating pain.        Intervention Eval  08/08/2024 09/20/2024 09/27/2024    Neuro Re-Ed Rehabilitative ultrasound     pelvic floor downtraining with assist of RUSI  and rehabilitative ultrasound imaging to help visualize pelvic floor muscle contraction and relaxation with kegels  PF down training with cues for proper mechanics - pt with muscle tension instead of mobility. Able to correct at times.   Encouraged body awareness and PF mobility with breathing and self vaginal penetration. Encouraged a positive experience and not tolerating pain.    Manual Ther Vaginal exam    pain impaired mobility     TherAct       Provided education on sleep habits and breathing mechanics.     Provided education on the relationship between anxiety/stress and muscle tension. Educated on the function of the pelvic floor with bladder control and the importance of PF mobility. She does report to me that she has been cutting her leg out of self harm. She has been seeing her counselor regularly due to this. I also discussed with her the crisis assist line and to call for assistance if needed. She voiced understanding.  provided education on home perineal desensitization to tolerance. Many have to scale back and start with visual imagery prior to vaginal penetration. See education provided.          Home Exercises Provided and Patient Education Provided     Education provided:   - anatomy/physiology of pelvic floor, posture/body mechanices, and proper bearing down techniques  Discussed progression of plan of care with patient; educated pt in activity modification; reviewed HEP with pt. Pt demonstrated and verbalized understanding of all instruction and was provided with a handout of HEP (see Patient Instructions).    Written Home Exercises Provided: yes.  Exercises were reviewed and Keyla was able to demonstrate them  prior to the end of the session.  Keyla demonstrated good  understanding of the education provided.     See EMR under Patient Instructions for exercises provided 09/27/2024 .    Assessment     Good understanding of physical therapist recommendations. Willing to comply.     Keyla Is progressing well towards her goals.   Pt prognosis is Good.     Pt will continue to benefit from skilled outpatient physical therapy to address the deficits listed in the problem list box on initial evaluation, provide pt/family education and to maximize pt's level of independence in the home and community environment.     Pt's spiritual, cultural and educational needs considered and pt agreeable to plan of care and goals.     Anticipated barriers to physical therapy: none    Short Term Goals: 6 weeks   Patient will deny leakage of urine. Goal not met - progressing   Patient will be independent with pelvic floor relaxation to improve bowel and bladder evacuation. Goal not met - progressing   Patient will be able to demonstrate improved pelvic floor relaxation and contraction on rehabilitative ultrasound. Goal not met - progressing   Patient will report feeling the urge to urinate. Goal not met - progressing   Patient will report improved water intake. Goal not met - progressing      Long Term Goals: 12 weeks   Patient will report urinating every 3-4 hours with strong urine stream. Goal not met - progressing   Patient will deny nocturia. Goal not met - progressing   Patient will deny pelvic pain to allow for intimacy with her wife. Goal not met - progressing   Patient will demonstrate adequate pelvic floor coordination with contraction and relaxation on sEMG vs rehabilitative ultrasound.  Goal not met - progressing     Plan     Continue with progressive mobility of the pelvic floor and desensitization. Monitor home exercise program.     Raf Hawley, PT

## 2024-09-27 NOTE — PATIENT INSTRUCTIONS
Use your thumb to desensitize the vaginal tissue  - can do this every day to every other day as you tolerate   - heat helps muscle relaxation and blood flow   - avoid salts   - vaginal area only needs water  - we want the brain to learn that touching the vaginal area does not have to equal pain   - initially do not move the finger very much  - if you need, can take another step back and simply practice in your mind successful vaginal penetration or what ever you feel comfortable with   - with vibration - ok to use if it does not hurt if it hurts, turn it off   - pain will tell you it is too much  - do not get wife involved just yet - I want you in complete control of the situation

## 2024-10-11 ENCOUNTER — CLINICAL SUPPORT (OUTPATIENT)
Dept: REHABILITATION | Facility: HOSPITAL | Age: 34
End: 2024-10-11
Payer: MEDICARE

## 2024-10-11 DIAGNOSIS — M62.838 MUSCLE SPASM: ICD-10-CM

## 2024-10-11 DIAGNOSIS — R27.8 COORDINATION ABNORMAL: ICD-10-CM

## 2024-10-11 DIAGNOSIS — R10.2 PELVIC PAIN: Primary | ICD-10-CM

## 2024-10-11 PROCEDURE — 97112 NEUROMUSCULAR REEDUCATION: CPT | Mod: PN | Performed by: PHYSICAL THERAPIST

## 2024-10-11 PROCEDURE — 97530 THERAPEUTIC ACTIVITIES: CPT | Mod: PN | Performed by: PHYSICAL THERAPIST

## 2024-10-11 PROCEDURE — 97140 MANUAL THERAPY 1/> REGIONS: CPT | Mod: PN | Performed by: PHYSICAL THERAPIST

## 2024-10-11 NOTE — PATIENT INSTRUCTIONS
When you urinate and poop the pelvic floor relaxes  - I do not want you pushing the urine out  - the better you empty the better urgency will be, the better leakage will be       Get a Squatty Potty - puts your body in a better position to evacuate urine and stool     Intimacy without penetration or avoiding the pain trigger   - no pain, no problem   - visual imagery - going through a situatuation that would arouse you while keeping the pelvic floor more relaxed and not having pain     Ask MD about meds for bowels:  - Fiber  - Stool softener  - Miralax  - Magnesium    CONSISTENCY!

## 2024-10-11 NOTE — PROGRESS NOTES
Pelvic Health Physical Therapy   Treatment Note      Name: Keyla Caal  Clinic Number: [id number]    Therapy Diagnosis:   Encounter Diagnoses   Name Primary?    Pelvic pain Yes    Muscle spasm     Coordination abnormal      Physician: Tiny Thibodeaux MD    Visit Date: 10/11/2024    Physician Orders: PT Eval and Treat PF PT  Medical Diagnosis from Referral: R10.2 (ICD-10-CM) - Pelvic pain  Evaluation Date: 7/31/2024  Authorization Period Expiration: 5/28/2025  Plan of Care Expiration: 10/23/2024  Progress Note Due: 10/20/2024  Visit # 5/12 Visits authorized: 4/20   FOTO: 1/3    Cancelled Visits: 0  No Show Visits: 0    Time In: 10:30 AM   Time Out: 11:12 AM   Total Billable Time: 42 minutes    Precautions: Standard    Subjective     Pt reports: Perineal sensitivity is improving. Still constipated. Does not feel bloated. Cannot push for a bowel movement. States that she is still urinating a lot. Still with a little leakage.     She was compliant with home exercise program.  Response to previous treatment: none  Functional change: none    Pain in:  0/10  Pain out: 0/10  Location: pelvis        Objective   Keyla participated in dynamic functional therapeutic activities to improve functional performance for 10 minutes, including: Encouraged body awareness and PF mobility with breathing and self vaginal penetration. Encouraged a positive experience and not tolerating pain. Encouraged to get her wife involved if she is willing. Needs better bowel management and use of a squatty potty.     Keyla participated in neuromuscular re-education activities to develop Coordination, Control, Down training, and Proprioception for 15 minutes including: rehabilitative ultrasound for PF down training and awareness.     Keyla received the following manual therapy techniques: to develop flexibility for 10 minutes including: trigger point/myofascial release of bilateral bulbocavernosus           Intervention Eval  08/08/2024  09/20/2024  09/27/2024  10/11/2024    Neuro Re-Ed Rehabilitative ultrasound     pelvic floor downtraining with assist of RUSI  and rehabilitative ultrasound imaging to help visualize pelvic floor muscle contraction and relaxation with kegels  PF down training with cues for proper mechanics - pt with muscle tension instead of mobility. Able to correct at times.   Encouraged body awareness and PF mobility with breathing and self vaginal penetration. Encouraged a positive experience and not tolerating pain.  rehabilitative ultrasound for PF down training and awareness.    Manual Ther Vaginal exam    pain impaired mobility   trigger point/myofascial release of bilateral bulbocavernosus    TherAct       Provided education on sleep habits and breathing mechanics.     Provided education on the relationship between anxiety/stress and muscle tension. Educated on the function of the pelvic floor with bladder control and the importance of PF mobility. She does report to me that she has been cutting her leg out of self harm. She has been seeing her counselor regularly due to this. I also discussed with her the crisis assist line and to call for assistance if needed. She voiced understanding.  provided education on home perineal desensitization to tolerance. Many have to scale back and start with visual imagery prior to vaginal penetration. See education provided.    Encouraged body awareness and PF mobility with breathing and self vaginal penetration. Encouraged a positive experience and not tolerating pain. Encouraged to get her wife involved if she is willing. Needs better bowel management and use of a squatty potty.        Home Exercises Provided and Patient Education Provided     Education provided:   - anatomy/physiology of pelvic floor, posture/body mechanices, and proper bearing down techniques  Discussed progression of plan of care with patient; educated pt in activity modification; reviewed HEP with pt. Pt demonstrated  and verbalized understanding of all instruction and was provided with a handout of HEP (see Patient Instructions).    Written Home Exercises Provided: yes.  Exercises were reviewed and Keyla was able to demonstrate them prior to the end of the session.  Keyla demonstrated good  understanding of the education provided.     See EMR under Patient Instructions for exercises provided 10/11/2024 .    Assessment     Good understanding of physical therapist recommendations. Willing to comply.     Keyla Is progressing well towards her goals.   Pt prognosis is Good.     Pt will continue to benefit from skilled outpatient physical therapy to address the deficits listed in the problem list box on initial evaluation, provide pt/family education and to maximize pt's level of independence in the home and community environment.     Pt's spiritual, cultural and educational needs considered and pt agreeable to plan of care and goals.     Anticipated barriers to physical therapy: none    Short Term Goals: 6 weeks   Patient will deny leakage of urine. Goal not met - progressing   Patient will be independent with pelvic floor relaxation to improve bowel and bladder evacuation. Goal not met - progressing   Patient will be able to demonstrate improved pelvic floor relaxation and contraction on rehabilitative ultrasound. Goal not met - progressing   Patient will report feeling the urge to urinate. Goal not met - progressing   Patient will report improved water intake. Goal not met - progressing      Long Term Goals: 12 weeks   Patient will report urinating every 3-4 hours with strong urine stream. Goal not met - progressing   Patient will deny nocturia. Goal not met - progressing   Patient will deny pelvic pain to allow for intimacy with her wife. Goal not met - progressing   Patient will demonstrate adequate pelvic floor coordination with contraction and relaxation on sEMG vs rehabilitative ultrasound.  Goal not met - progressing      Plan     Continue with progressive mobility of the pelvic floor and desensitization. Monitor home exercise program. Pain with arousal.     Raf Hawley, PT

## 2024-10-18 ENCOUNTER — CLINICAL SUPPORT (OUTPATIENT)
Dept: REHABILITATION | Facility: HOSPITAL | Age: 34
End: 2024-10-18
Payer: MEDICARE

## 2024-10-18 DIAGNOSIS — R10.2 PELVIC PAIN: Primary | ICD-10-CM

## 2024-10-18 DIAGNOSIS — M62.838 MUSCLE SPASM: ICD-10-CM

## 2024-10-18 DIAGNOSIS — R27.8 COORDINATION ABNORMAL: ICD-10-CM

## 2024-10-18 PROCEDURE — 97530 THERAPEUTIC ACTIVITIES: CPT | Mod: PN | Performed by: PHYSICAL THERAPIST

## 2024-10-18 PROCEDURE — 97032 APPL MODALITY 1+ESTIM EA 15: CPT | Mod: PN | Performed by: PHYSICAL THERAPIST

## 2024-10-18 NOTE — PROGRESS NOTES
"  Pelvic Health Physical Therapy   Treatment Note      Name: Keyla Caal  Clinic Number: [id number]    Therapy Diagnosis:   Encounter Diagnoses   Name Primary?    Pelvic pain Yes    Coordination abnormal     Muscle spasm      Physician: Tiny Thibodeaux MD    Visit Date: 10/18/2024    Physician Orders: PT Eval and Treat PF PT  Medical Diagnosis from Referral: R10.2 (ICD-10-CM) - Pelvic pain  Evaluation Date: 7/31/2024  Authorization Period Expiration: 5/28/2025  Plan of Care Expiration: 10/23/2024  Progress Note Due: 10/20/2024  Visit # 6/12 Visits authorized: 5/20   FOTO: 1/3    Cancelled Visits: 0  No Show Visits: 0    Time In: 10:22 AM   Time Out: 11:05 AM   Total Billable Time: 43 minutes    Precautions: Standard    Subjective     Pt reports: She saw her PCP yesterday due to back pain pain due to picking up a "cow". States she cannot put her hands over her head without pain. X-ray was unremarkable. She was given Mobic and a muscle relaxer. Has been able to poop "A LOT"! She has been doing apple juice and Miralax. Feel better aware of the pelvic floor. Leakage of urine is better. 2 muscle relaxers do assist. Did ice the first night, did heat last night and it burned - heat was on top of her shirt. She did hear a pop when she lifted the cow. Upon further assessment - pain appears to be in the right scapular area.     She was compliant with home exercise program.  Response to previous treatment: none  Functional change: none    Pain in:  7/10   Pain out: 6/10  Location: thoracic spine across her bra line, reports tingling in the right arm and her back       Objective   Keyla participated in dynamic functional therapeutic activities to improve functional performance for 28 minutes, including: Educated on posture and ROM of the bilateral Ues to assist with pain muscular mobility. Patient performed pulleys, rhomboid stretch and corner pec stretch.     TENS with CP applied to her thoracic spine x 15 min - " educated on use of home TENS unit and MHP/ice to assist with pain.        Intervention Eval  08/08/2024 09/20/2024  09/27/2024  10/11/2024  10/18/2024    Neuro Re-Ed Rehabilitative ultrasound     pelvic floor downtraining with assist of RUSI  and rehabilitative ultrasound imaging to help visualize pelvic floor muscle contraction and relaxation with kegels  PF down training with cues for proper mechanics - pt with muscle tension instead of mobility. Able to correct at times.   Encouraged body awareness and PF mobility with breathing and self vaginal penetration. Encouraged a positive experience and not tolerating pain.  rehabilitative ultrasound for PF down training and awareness.     Manual Ther Vaginal exam    pain impaired mobility   trigger point/myofascial release of bilateral bulbocavernosus     TherAct       Provided education on sleep habits and breathing mechanics.     Provided education on the relationship between anxiety/stress and muscle tension. Educated on the function of the pelvic floor with bladder control and the importance of PF mobility. She does report to me that she has been cutting her leg out of self harm. She has been seeing her counselor regularly due to this. I also discussed with her the crisis assist line and to call for assistance if needed. She voiced understanding.  provided education on home perineal desensitization to tolerance. Many have to scale back and start with visual imagery prior to vaginal penetration. See education provided.    Encouraged body awareness and PF mobility with breathing and self vaginal penetration. Encouraged a positive experience and not tolerating pain. Encouraged to get her wife involved if she is willing. Needs better bowel management and use of a squatty potty.  Educated on posture and ROM of the bilateral Ues to assist with pain muscular mobility. Patient performed pulleys, rhomboid stretch and corner pec stretch.        Home Exercises Provided and  Patient Education Provided     Education provided:   - anatomy/physiology of pelvic floor, posture/body mechanices, and proper bearing down techniques  Discussed progression of plan of care with patient; educated pt in activity modification; reviewed HEP with pt. Pt demonstrated and verbalized understanding of all instruction and was provided with a handout of HEP (see Patient Instructions).    Written Home Exercises Provided: yes.  Exercises were reviewed and Keyla was able to demonstrate them prior to the end of the session.  Keyla demonstrated good  understanding of the education provided.     See EMR under Patient Instructions for exercises provided 10/18/2024 .    Assessment     Good understanding of physical therapist recommendations. Willing to comply.     Keyla Is progressing well towards her goals.   Pt prognosis is Good.     Pt will continue to benefit from skilled outpatient physical therapy to address the deficits listed in the problem list box on initial evaluation, provide pt/family education and to maximize pt's level of independence in the home and community environment.     Pt's spiritual, cultural and educational needs considered and pt agreeable to plan of care and goals.     Anticipated barriers to physical therapy: none    Short Term Goals: 6 weeks   Patient will deny leakage of urine. Goal not met - progressing   Patient will be independent with pelvic floor relaxation to improve bowel and bladder evacuation. Goal not met - progressing   Patient will be able to demonstrate improved pelvic floor relaxation and contraction on rehabilitative ultrasound. Goal not met - progressing   Patient will report feeling the urge to urinate. Goal not met - progressing   Patient will report improved water intake. Goal not met - progressing      Long Term Goals: 12 weeks   Patient will report urinating every 3-4 hours with strong urine stream. Goal not met - progressing   Patient will deny nocturia. Goal  not met - progressing   Patient will deny pelvic pain to allow for intimacy with her wife. Goal not met - progressing   Patient will demonstrate adequate pelvic floor coordination with contraction and relaxation on sEMG vs rehabilitative ultrasound.  Goal not met - progressing     Plan     Continue with progressive mobility of the pelvic floor and desensitization. Monitor home exercise program. Pain with arousal. Monitor thoracic spine pain.     Raf Hawley, PT

## 2024-10-25 ENCOUNTER — CLINICAL SUPPORT (OUTPATIENT)
Dept: REHABILITATION | Facility: HOSPITAL | Age: 34
End: 2024-10-25
Payer: MEDICARE

## 2024-10-25 DIAGNOSIS — M62.838 MUSCLE SPASM: ICD-10-CM

## 2024-10-25 DIAGNOSIS — R10.2 PELVIC PAIN: Primary | ICD-10-CM

## 2024-10-25 DIAGNOSIS — R27.8 COORDINATION ABNORMAL: ICD-10-CM

## 2024-10-25 PROCEDURE — 97140 MANUAL THERAPY 1/> REGIONS: CPT | Mod: PN | Performed by: PHYSICAL THERAPIST

## 2024-10-25 PROCEDURE — 97530 THERAPEUTIC ACTIVITIES: CPT | Mod: PN | Performed by: PHYSICAL THERAPIST

## 2024-10-25 NOTE — PLAN OF CARE
Pelvic Health Physical Therapy   Treatment Note and Progress Note and Updated POC     Name: Keyla Caal  Clinic Number: [id number]    Therapy Diagnosis:   Encounter Diagnoses   Name Primary?    Pelvic pain Yes    Coordination abnormal     Muscle spasm      Physician: Tiny Thibodeaux MD    Visit Date: 10/25/2024    Physician Orders: PT Eval and Treat PF PT  Medical Diagnosis from Referral: R10.2 (ICD-10-CM) - Pelvic pain  Evaluation Date: 7/31/2024  Authorization Period Expiration: 5/28/2025  Plan of Care Expiration: 1/17/2025  Progress Note Due: 11/25/2024  Visit # 7/12 Visits authorized: 6/20   FOTO: 2/3     Cancelled Visits: 0  No Show Visits: 0    Time In: 10:25 AM   Time Out: 11:05 AM   Total Billable Time: 40 minutes    Precautions: Standard    Subjective     Pt reports: Has not pooped in 2 days. Drinking good water. Took Miralax 2xs with apple juice, has not used consistently. Not leaking lately, will drip after she urinates.     She was compliant with home exercise program.  Response to previous treatment: none  Functional change: none    Pain in:  0/10 thoracic spine pain - does hurt while standing for a while   Pain out: uncomfortable on the right/10  Location: pelvis    Objective     Keyla participated in dynamic functional therapeutic activities to improve functional performance for 25 minutes, including:   Has not been using her wand - needs to. Needs better pelvic floor mobility. Reviewed breathing mechanics - needs better mechanics.     Keyla received the following manual therapy techniques: to develop flexibility and extensibility for 15 minutes including: trigger point/myofascial release of trigger point/myofascial release of bilateral bulbocavernosus , external bulbocavernosus approximation       Intervention 10/11/2024  10/18/2024  10/25/2024    Neuro Re-Ed Rehabilitative ultrasound  rehabilitative ultrasound for PF down training and awareness.      Manual Ther Vaginal exam trigger  point/myofascial release of bilateral bulbocavernosus   trigger point/myofascial release of bilateral bulbocavernosus    TherAct  Encouraged body awareness and PF mobility with breathing and self vaginal penetration. Encouraged a positive experience and not tolerating pain. Encouraged to get her wife involved if she is willing. Needs better bowel management and use of a squatty potty.  Educated on posture and ROM of the bilateral Ues to assist with pain muscular mobility. Patient performed pulleys, rhomboid stretch and corner pec stretch.  Reviewed breathing mechanics - needs better mechanics.        Home Exercises Provided and Patient Education Provided     Education provided:   - anatomy/physiology of pelvic floor, posture/body mechanices, and proper bearing down techniques  Discussed progression of plan of care with patient; educated pt in activity modification; reviewed HEP with pt. Pt demonstrated and verbalized understanding of all instruction and was provided with a handout of HEP (see Patient Instructions).    Written Home Exercises Provided: yes.  Exercises were reviewed and Keyla was able to demonstrate them prior to the end of the session.  Keyla demonstrated good  understanding of the education provided.     See EMR under Patient Instructions for exercises provided 10/25/2024 .    Assessment     Good understanding of physical therapist recommendations. Willing to comply.     Keyla Is progressing well towards her goals.   Pt prognosis is Good.     Pt will continue to benefit from skilled outpatient physical therapy to address the deficits listed in the problem list box on initial evaluation, provide pt/family education and to maximize pt's level of independence in the home and community environment.     Pt's spiritual, cultural and educational needs considered and pt agreeable to plan of care and goals.     Anticipated barriers to physical therapy: none    Short Term Goals: 6 weeks   Patient will  deny leakage of urine. Goal not met - progressing   Patient will be independent with pelvic floor relaxation to improve bowel and bladder evacuation. Goal not met - progressing   Patient will be able to demonstrate improved pelvic floor relaxation and contraction on rehabilitative ultrasound. Goal not met - progressing   Patient will report feeling the urge to urinate. Goal not met - progressing   Patient will report improved water intake. Goal not met - progressing      Long Term Goals: 12 weeks   Patient will report urinating every 3-4 hours with strong urine stream. Goal not met - progressing   Patient will deny nocturia. Goal not met - progressing   Patient will deny pelvic pain to allow for intimacy with her wife. Goal not met - progressing   Patient will demonstrate adequate pelvic floor coordination with contraction and relaxation on sEMG vs rehabilitative ultrasound.  Goal not met - progressing     Plan   Continue with plan of care 1x a week x 12 weeks.     Continue with progressive mobility of the pelvic floor and desensitization. Monitor home exercise program. Pain with arousal. Monitor thoracic spine pain.   Pain with vaginal manual therapy today.     Raf Hawley, PT

## 2024-10-25 NOTE — PATIENT INSTRUCTIONS
Miralax - small amount every day  - 1/2 cap every day    - if that is too much do 1/2 cap every other day   - if that is not enough increase to 1 cap every day   - goal is to prevent constipation, not continue to treat it     May also consider fiber  - Benefiber, Citrucel, Metamucil   - small amount then increase  - goal is stool that is soft and easy to pass       When you urinate - relax the pelvic floor, after you are done, you can do a gentle Kegel to close the urethra     Practice breathing - work more on full belly breath - YouTube - Diaphragmatic breathing

## 2024-10-25 NOTE — PROGRESS NOTES
Pelvic Health Physical Therapy   Treatment Note and Progress Note and Updated POC     Name: Keyla Caal  Clinic Number: [id number]    Therapy Diagnosis:   Encounter Diagnoses   Name Primary?    Pelvic pain Yes    Coordination abnormal     Muscle spasm      Physician: Tiny Thibodeaux MD    Visit Date: 10/25/2024    Physician Orders: PT Eval and Treat PF PT  Medical Diagnosis from Referral: R10.2 (ICD-10-CM) - Pelvic pain  Evaluation Date: 7/31/2024  Authorization Period Expiration: 5/28/2025  Plan of Care Expiration: 1/17/2025  Progress Note Due: 11/25/2024  Visit # 7/12 Visits authorized: 6/20   FOTO: 2/3     Cancelled Visits: 0  No Show Visits: 0    Time In: 10:25 AM   Time Out: 11:05 AM   Total Billable Time: 40 minutes    Precautions: Standard    Subjective     Pt reports: Has not pooped in 2 days. Drinking good water. Took Miralax 2xs with apple juice, has not used consistently. Not leaking lately, will drip after she urinates.     She was compliant with home exercise program.  Response to previous treatment: none  Functional change: none    Pain in:  0/10 thoracic spine pain - does hurt while standing for a while   Pain out: uncomfortable on the right/10  Location: pelvis    Objective     Keyla participated in dynamic functional therapeutic activities to improve functional performance for 25 minutes, including:   Has not been using her wand - needs to. Needs better pelvic floor mobility. Reviewed breathing mechanics - needs better mechanics.     Keyla received the following manual therapy techniques: to develop flexibility and extensibility for 15 minutes including: trigger point/myofascial release of trigger point/myofascial release of bilateral bulbocavernosus  , external bulbocavernosus approximation       Intervention 10/11/2024  10/18/2024  10/25/2024    Neuro Re-Ed Rehabilitative ultrasound  rehabilitative ultrasound for PF down training and awareness.      Manual Ther Vaginal exam trigger  point/myofascial release of bilateral bulbocavernosus   trigger point/myofascial release of bilateral bulbocavernosus    TherAct  Encouraged body awareness and PF mobility with breathing and self vaginal penetration. Encouraged a positive experience and not tolerating pain. Encouraged to get her wife involved if she is willing. Needs better bowel management and use of a squatty potty.  Educated on posture and ROM of the bilateral Ues to assist with pain muscular mobility. Patient performed pulleys, rhomboid stretch and corner pec stretch.  Reviewed breathing mechanics - needs better mechanics.        Home Exercises Provided and Patient Education Provided     Education provided:   - anatomy/physiology of pelvic floor, posture/body mechanices, and proper bearing down techniques  Discussed progression of plan of care with patient; educated pt in activity modification; reviewed HEP with pt. Pt demonstrated and verbalized understanding of all instruction and was provided with a handout of HEP (see Patient Instructions).    Written Home Exercises Provided: yes.  Exercises were reviewed and Keyla was able to demonstrate them prior to the end of the session.  Keyla demonstrated good  understanding of the education provided.     See EMR under Patient Instructions for exercises provided 10/25/2024 .    Assessment     Good understanding of physical therapist recommendations. Willing to comply.     Keyla Is progressing well towards her goals.   Pt prognosis is Good.     Pt will continue to benefit from skilled outpatient physical therapy to address the deficits listed in the problem list box on initial evaluation, provide pt/family education and to maximize pt's level of independence in the home and community environment.     Pt's spiritual, cultural and educational needs considered and pt agreeable to plan of care and goals.     Anticipated barriers to physical therapy: none    Short Term Goals: 6 weeks   Patient will  deny leakage of urine. Goal not met - progressing   Patient will be independent with pelvic floor relaxation to improve bowel and bladder evacuation. Goal not met - progressing   Patient will be able to demonstrate improved pelvic floor relaxation and contraction on rehabilitative ultrasound. Goal not met - progressing   Patient will report feeling the urge to urinate. Goal not met - progressing   Patient will report improved water intake. Goal not met - progressing      Long Term Goals: 12 weeks   Patient will report urinating every 3-4 hours with strong urine stream. Goal not met - progressing   Patient will deny nocturia. Goal not met - progressing   Patient will deny pelvic pain to allow for intimacy with her wife. Goal not met - progressing   Patient will demonstrate adequate pelvic floor coordination with contraction and relaxation on sEMG vs rehabilitative ultrasound.  Goal not met - progressing     Plan   Continue with plan of care 1x a week x 12 weeks.     Continue with progressive mobility of the pelvic floor and desensitization. Monitor home exercise program. Pain with arousal. Monitor thoracic spine pain.   Pain with vaginal manual therapy today.     Raf Hawley, PT

## 2024-11-01 ENCOUNTER — CLINICAL SUPPORT (OUTPATIENT)
Dept: REHABILITATION | Facility: HOSPITAL | Age: 34
End: 2024-11-01
Payer: MEDICARE

## 2024-11-01 DIAGNOSIS — R10.2 PELVIC PAIN: Primary | ICD-10-CM

## 2024-11-01 DIAGNOSIS — M62.838 MUSCLE SPASM: ICD-10-CM

## 2024-11-01 DIAGNOSIS — R27.8 COORDINATION ABNORMAL: ICD-10-CM

## 2024-11-01 PROCEDURE — 97530 THERAPEUTIC ACTIVITIES: CPT | Mod: KX,PN | Performed by: PHYSICAL THERAPIST

## 2024-11-01 PROCEDURE — 97112 NEUROMUSCULAR REEDUCATION: CPT | Mod: KX,PN | Performed by: PHYSICAL THERAPIST

## 2024-11-15 ENCOUNTER — CLINICAL SUPPORT (OUTPATIENT)
Dept: REHABILITATION | Facility: HOSPITAL | Age: 34
End: 2024-11-15
Payer: MEDICARE

## 2024-11-15 DIAGNOSIS — R10.2 PELVIC PAIN: Primary | ICD-10-CM

## 2024-11-15 DIAGNOSIS — R27.8 COORDINATION ABNORMAL: ICD-10-CM

## 2024-11-15 DIAGNOSIS — M62.838 MUSCLE SPASM: ICD-10-CM

## 2024-11-15 PROCEDURE — 97530 THERAPEUTIC ACTIVITIES: CPT | Mod: PN | Performed by: PHYSICAL THERAPIST

## 2024-11-15 NOTE — PROGRESS NOTES
Pelvic Health Physical Therapy   Treatment Note     Name: Keyla Caal  Clinic Number: [id number]    Therapy Diagnosis:   Encounter Diagnoses   Name Primary?    Pelvic pain Yes    Muscle spasm     Coordination abnormal      Physician: Tiny Thibodeaux MD    Visit Date: 11/15/2024    Physician Orders: PT Eval and Treat PF PT  Medical Diagnosis from Referral: R10.2 (ICD-10-CM) - Pelvic pain  Evaluation Date: 7/31/2024  Authorization Period Expiration: 5/28/2025  Plan of Care Expiration: 1/17/2025  Progress Note Due: 11/25/2024  Visit # 9/12 Visits authorized: 8/20   FOTO: 2/3     Cancelled Visits: 0  No Show Visits: 0    Time In: 10:19 AM   Time Out: 10:42 AM    Total Billable Time: 23 minutes    Precautions: Standard    Subjective     Pt reports: Took meds for constipation today from her boss, not sure what it was. She did drink a cup of coffee. Did not poop yesterday. Was she did assist with her hand and was not able to have a bowel movement.     She was compliant with home exercise program.  Response to previous treatment: none  Functional change: none    Pain in:  0/10   Pain out: 0/10  Location: pelvis    Objective     Keyla participated in dynamic functional therapeutic activities to improve functional performance for 23 minutes, including: Educated on bowel management. Needs to talk to MD or pharmacist for med recommendations. Provided education on gastro colic reflex to assist as well.       Intervention 10/11/2024  10/18/2024  10/25/2024  11/01/2024  11/15/2024    Neuro Re-Ed Rehabilitative ultrasound  rehabilitative ultrasound for PF down training and awareness.    RUSI for breathing, drops, contractions of the PFM to help pt visualize PFM motion and function. and rehabilitative ultrasound imaging to help visualize pelvic floor muscle contraction and relaxation with kegels  Educated on pelvic floor awareness.      Manual Ther Vaginal exam trigger point/myofascial release of bilateral  bulbocavernosus   trigger point/myofascial release of bilateral bulbocavernosus      TherAct  Encouraged body awareness and PF mobility with breathing and self vaginal penetration. Encouraged a positive experience and not tolerating pain. Encouraged to get her wife involved if she is willing. Needs better bowel management and use of a squatty potty.  Educated on posture and ROM of the bilateral Ues to assist with pain muscular mobility. Patient performed pulleys, rhomboid stretch and corner pec stretch.  Reviewed breathing mechanics - needs better mechanics.  Educated on the mechanics of the pelvic floor. Discussed positions with intercourse to assist with pain.  Educated on bowel management. Needs to talk to MD or pharmacist for med recommendations. Provided education on gastro colic reflex to assist as well.        Home Exercises Provided and Patient Education Provided     Education provided:   - anatomy/physiology of pelvic floor, posture/body mechanices, and proper bearing down techniques  Discussed progression of plan of care with patient; educated pt in activity modification; reviewed HEP with pt. Pt demonstrated and verbalized understanding of all instruction and was provided with a handout of HEP (see Patient Instructions).    Written Home Exercises Provided: yes.  Exercises were reviewed and Keyla was able to demonstrate them prior to the end of the session.  Keyla demonstrated good  understanding of the education provided.     See EMR under Patient Instructions for exercises provided 11/15/2024 .    Assessment     Good understanding of physical therapist recommendations. Willing to comply.     Keyla Is progressing well towards her goals.   Pt prognosis is Good.     Pt will continue to benefit from skilled outpatient physical therapy to address the deficits listed in the problem list box on initial evaluation, provide pt/family education and to maximize pt's level of independence in the home and  community environment.     Pt's spiritual, cultural and educational needs considered and pt agreeable to plan of care and goals.     Anticipated barriers to physical therapy: none    Short Term Goals: 6 weeks   Patient will deny leakage of urine. Goal not met - progressing   Patient will be independent with pelvic floor relaxation to improve bowel and bladder evacuation. Goal not met - progressing   Patient will be able to demonstrate improved pelvic floor relaxation and contraction on rehabilitative ultrasound. Goal not met - progressing   Patient will report feeling the urge to urinate. Goal not met - progressing   Patient will report improved water intake. Goal not met - progressing      Long Term Goals: 12 weeks   Patient will report urinating every 3-4 hours with strong urine stream. Goal not met - progressing   Patient will deny nocturia. Goal not met - progressing   Patient will deny pelvic pain to allow for intimacy with her wife. Goal not met - progressing   Patient will demonstrate adequate pelvic floor coordination with contraction and relaxation on sEMG vs rehabilitative ultrasound.  Goal not met - progressing     Plan   Continue with plan of care 1x a week x 12 weeks. pt deferred internal work last visit due to constipation. Patient will return in 1 month.     Raf Hawley, PT

## 2024-11-15 NOTE — PATIENT INSTRUCTIONS
Constipation:   - MUST have good water intake - 4-5 bottles of water a day!   - if you are currently drinking 1 bottle, start with 2 then increase as you can     Consistent use of over the counter meds: talk with MD or pharmacist   - Miralax - 1 cap a day, if that is too much do 1/2 cap a day, if that is still too much do a 1/2 every other day   - Fiber powder - start small then increase as you can   - Magnesium Citrate - can help with bowel movement and rest - 2 capsules a night     Gastro colic reflex: morning routine  Wake up - drink some water   Go urinate if you need  Return to bed or a chair - comfortable   Abdominal massage using 3 flat fingers - ILU - 2-3 min as often as you need  Do not suppress the urge for a bowel movement - ever  15 min after you are awake - NOTICE the urge for a bowel movement  Attempt to have BM - Put your feet on a stool  Relax the pelvic floor do not strain - imagine the rectum like a flower blooming

## 2024-11-22 PROBLEM — J20.8 ACUTE BRONCHITIS DUE TO OTHER SPECIFIED ORGANISMS: Status: ACTIVE | Noted: 2024-11-22

## 2024-12-09 PROBLEM — F17.290 VAPING NICOTINE DEPENDENCE, TOBACCO PRODUCT: Status: ACTIVE | Noted: 2024-12-09

## 2024-12-09 PROBLEM — J20.8 ACUTE BRONCHITIS DUE TO OTHER SPECIFIED ORGANISMS: Status: RESOLVED | Noted: 2024-11-22 | Resolved: 2024-12-09

## 2024-12-09 PROBLEM — J96.01 ACUTE RESPIRATORY FAILURE WITH HYPOXIA: Status: RESOLVED | Noted: 2023-04-25 | Resolved: 2024-12-09

## 2025-01-07 ENCOUNTER — CLINICAL SUPPORT (OUTPATIENT)
Dept: REHABILITATION | Facility: HOSPITAL | Age: 35
End: 2025-01-07
Payer: MEDICARE

## 2025-01-07 DIAGNOSIS — M62.838 MUSCLE SPASM: ICD-10-CM

## 2025-01-07 DIAGNOSIS — R27.8 COORDINATION ABNORMAL: ICD-10-CM

## 2025-01-07 DIAGNOSIS — R10.2 PELVIC PAIN: Primary | ICD-10-CM

## 2025-01-07 PROCEDURE — 97530 THERAPEUTIC ACTIVITIES: CPT | Performed by: PHYSICAL THERAPIST

## 2025-01-07 NOTE — PROGRESS NOTES
Pelvic Health Physical Therapy   Treatment Note and Discharge Summary     Name: Keyla Caal  Clinic Number: [id number]    Therapy Diagnosis:   Encounter Diagnoses   Name Primary?    Pelvic pain Yes    Muscle spasm     Coordination abnormal      Physician: Tiny Thibodeaux MD    Visit Date: 1/7/2025    Physician Orders: PT Eval and Treat PF PT  Medical Diagnosis from Referral: R10.2 (ICD-10-CM) - Pelvic pain  Evaluation Date: 7/31/2024  Authorization Period Expiration: 5/28/2025  Plan of Care Expiration: 1/17/2025  Progress Note Due: 11/25/2024  Visit # 10/12 Visits authorized: 1/10   FOTO: 2/3       Cancelled Visits: 0  No Show Visits: 0    Time In: 7:30 AM   Time Out: 8:12 AM   Total Billable Time: 42 minutes    Precautions: Standard    Subjective     Pt reports: States that she has not had sex so she is not sure how she is doing in regards to pelvic pain. Has not had pain due to not having inter course. Has been taking stool softeners and moves every day. Does not have to push to have a bowel movement. Leakage is not as bad - does leak with sneezing and straining. Will urinate, it stops then will urinate again. She does report that her breasts are itching.     She was compliant with home exercise program.  Response to previous treatment: none  Functional change: none    Pain in:  0/10   Pain out: 0/10  Location: pelvis    Objective     Keyla participated in dynamic functional therapeutic activities to improve functional performance for 42 minutes, including: Educated on contacting her gyn if she notices any redness to her breasts or nipple discharge. Otherwise, recommended a regular moisturizer and switching from dial soap. Continue with good bowel management and PF awareness.       Intervention 10/11/2024  10/18/2024  10/25/2024  11/01/2024  11/15/2024  01/07/2025    Neuro Re-Ed Rehabilitative ultrasound  rehabilitative ultrasound for PF down training and awareness.    RUSI for breathing, drops,  contractions of the PFM to help pt visualize PFM motion and function. and rehabilitative ultrasound imaging to help visualize pelvic floor muscle contraction and relaxation with kegels  Educated on pelvic floor awareness.       Manual Ther Vaginal exam trigger point/myofascial release of bilateral bulbocavernosus   trigger point/myofascial release of bilateral bulbocavernosus       TherAct  Encouraged body awareness and PF mobility with breathing and self vaginal penetration. Encouraged a positive experience and not tolerating pain. Encouraged to get her wife involved if she is willing. Needs better bowel management and use of a squatty potty.  Educated on posture and ROM of the bilateral Ues to assist with pain muscular mobility. Patient performed pulleys, rhomboid stretch and corner pec stretch.  Reviewed breathing mechanics - needs better mechanics.  Educated on the mechanics of the pelvic floor. Discussed positions with intercourse to assist with pain.  Educated on bowel management. Needs to talk to MD or pharmacist for med recommendations. Provided education on gastro colic reflex to assist as well.  Educated on contacting her gyn if she notices any redness to her breasts or nipple discharge. Otherwise, recommended a regular moisturizer and switching from dial soap. Continue with good bowel management and PF awareness.        Home Exercises Provided and Patient Education Provided     Education provided:   - anatomy/physiology of pelvic floor, posture/body mechanices, and proper bearing down techniques  Discussed progression of plan of care with patient; educated pt in activity modification; reviewed HEP with pt. Pt demonstrated and verbalized understanding of all instruction and was provided with a handout of HEP (see Patient Instructions).    Written Home Exercises Provided: yes.  Exercises were reviewed and Keyla was able to demonstrate them prior to the end of the session.  Keyla demonstrated good   understanding of the education provided.     See EMR under Patient Instructions for exercises provided 01/07/2025 .    Assessment     Good understanding of physical therapist recommendations. Keyla has done very well with all physical therapist recommendations. Having difficulty connecting with her wife recently so has not been able to determine if she is having pain with arousal. She has recently lost weight and has given up smoking. Most notably, her anxiety and fear has improved significantly when considering intimacy.     Keyla Is progressing well towards her goals.   Pt prognosis is Good.     Pt will continue to benefit from skilled outpatient physical therapy to address the deficits listed in the problem list box on initial evaluation, provide pt/family education and to maximize pt's level of independence in the home and community environment.     Pt's spiritual, cultural and educational needs considered and pt agreeable to plan of care and goals.     Anticipated barriers to physical therapy: none    Short Term Goals: 6 weeks   Patient will deny leakage of urine. Goal not met - progressing - mild leakage with lifting, sneezing.    Patient will be independent with pelvic floor relaxation to improve bowel and bladder evacuation. Goal met 01/07/2025   Patient will be able to demonstrate improved pelvic floor relaxation and contraction on rehabilitative ultrasound. Goal met 01/07/2025   Patient will report feeling the urge to urinate. Goal met 01/07/2025   Patient will report improved water intake. Goal not met - progressing - could be better     Long Term Goals: 12 weeks   Patient will report urinating every 3-4 hours with strong urine stream. Goal not met - progressing - more with the cold  Patient will deny nocturia. Goal met 01/07/2025   Patient will deny pelvic pain to allow for intimacy with her wife. Goal not met - progressing - not assessed  Patient will demonstrate adequate pelvic floor coordination  with contraction and relaxation on sEMG vs rehabilitative ultrasound.  Goal not met - progressing     Plan   Discharge PF physical therapy at this time. Can return if needed with a new order from MD in the future.     Raf Hawley, PT

## 2025-02-18 PROBLEM — F17.290 VAPING NICOTINE DEPENDENCE, TOBACCO PRODUCT: Status: RESOLVED | Noted: 2024-12-09 | Resolved: 2025-02-18

## 2025-02-18 PROBLEM — Z72.0 TOBACCO USER: Status: RESOLVED | Noted: 2021-09-14 | Resolved: 2025-02-18

## 2025-02-27 ENCOUNTER — HOSPITAL ENCOUNTER (EMERGENCY)
Facility: HOSPITAL | Age: 35
Discharge: HOME | End: 2025-02-27
Attending: SURGERY
Payer: MEDICARE

## 2025-02-27 VITALS
BODY MASS INDEX: 40.59 KG/M2 | DIASTOLIC BLOOD PRESSURE: 87 MMHG | SYSTOLIC BLOOD PRESSURE: 160 MMHG | HEART RATE: 80 BPM | WEIGHT: 243.63 LBS | TEMPERATURE: 98 F | RESPIRATION RATE: 20 BRPM | HEIGHT: 65 IN | OXYGEN SATURATION: 97 %

## 2025-02-27 DIAGNOSIS — N75.0 BARTHOLIN GLAND CYST: ICD-10-CM

## 2025-02-27 DIAGNOSIS — N83.202 OVARIAN CYST, LEFT: Primary | ICD-10-CM

## 2025-02-27 DIAGNOSIS — R11.2 NAUSEA AND VOMITING, UNSPECIFIED VOMITING TYPE: ICD-10-CM

## 2025-02-27 LAB
ALBUMIN SERPL BCP-MCNC: 4.2 G/DL (ref 3.5–5.2)
ALP SERPL-CCNC: 74 U/L (ref 40–150)
ALT SERPL W/O P-5'-P-CCNC: 24 U/L (ref 10–44)
ANION GAP SERPL CALC-SCNC: 11 MMOL/L (ref 8–16)
AST SERPL-CCNC: 17 U/L (ref 10–40)
B-HCG UR QL: NEGATIVE
BACTERIA #/AREA URNS HPF: ABNORMAL /HPF
BASOPHILS # BLD AUTO: 0.05 K/UL (ref 0–0.2)
BASOPHILS NFR BLD: 0.4 % (ref 0–1.9)
BILIRUB SERPL-MCNC: 0.4 MG/DL (ref 0.1–1)
BILIRUB UR QL STRIP: NEGATIVE
BUN SERPL-MCNC: 17 MG/DL (ref 6–20)
CALCIUM SERPL-MCNC: 8.9 MG/DL (ref 8.7–10.5)
CHLORIDE SERPL-SCNC: 104 MMOL/L (ref 95–110)
CLARITY UR: CLEAR
CO2 SERPL-SCNC: 25 MMOL/L (ref 23–29)
COLOR UR: YELLOW
CREAT SERPL-MCNC: 0.8 MG/DL (ref 0.5–1.4)
DIFFERENTIAL METHOD BLD: ABNORMAL
EOSINOPHIL # BLD AUTO: 0.1 K/UL (ref 0–0.5)
EOSINOPHIL NFR BLD: 0.8 % (ref 0–8)
ERYTHROCYTE [DISTWIDTH] IN BLOOD BY AUTOMATED COUNT: 13.2 % (ref 11.5–14.5)
EST. GFR  (NO RACE VARIABLE): >60 ML/MIN/1.73 M^2
GLUCOSE SERPL-MCNC: 103 MG/DL (ref 70–110)
GLUCOSE UR QL STRIP: NEGATIVE
HCT VFR BLD AUTO: 43.8 % (ref 37–48.5)
HGB BLD-MCNC: 14.6 G/DL (ref 12–16)
HGB UR QL STRIP: NEGATIVE
HYALINE CASTS #/AREA URNS LPF: 1 /LPF
IMM GRANULOCYTES # BLD AUTO: 0.07 K/UL (ref 0–0.04)
IMM GRANULOCYTES NFR BLD AUTO: 0.6 % (ref 0–0.5)
KETONES UR QL STRIP: NEGATIVE
LEUKOCYTE ESTERASE UR QL STRIP: NEGATIVE
LIPASE SERPL-CCNC: 13 U/L (ref 4–60)
LYMPHOCYTES # BLD AUTO: 3.8 K/UL (ref 1–4.8)
LYMPHOCYTES NFR BLD: 32 % (ref 18–48)
MCH RBC QN AUTO: 29.9 PG (ref 27–31)
MCHC RBC AUTO-ENTMCNC: 33.3 G/DL (ref 32–36)
MCV RBC AUTO: 90 FL (ref 82–98)
MICROSCOPIC COMMENT: ABNORMAL
MONOCYTES # BLD AUTO: 0.7 K/UL (ref 0.3–1)
MONOCYTES NFR BLD: 5.7 % (ref 4–15)
NEUTROPHILS # BLD AUTO: 7.1 K/UL (ref 1.8–7.7)
NEUTROPHILS NFR BLD: 60.5 % (ref 38–73)
NITRITE UR QL STRIP: NEGATIVE
NRBC BLD-RTO: 0 /100 WBC
PH UR STRIP: 6 [PH] (ref 5–8)
PLATELET # BLD AUTO: 390 K/UL (ref 150–450)
PMV BLD AUTO: 9.2 FL (ref 9.2–12.9)
POTASSIUM SERPL-SCNC: 3.3 MMOL/L (ref 3.5–5.1)
PROT SERPL-MCNC: 7.5 G/DL (ref 6–8.4)
PROT UR QL STRIP: ABNORMAL
RBC # BLD AUTO: 4.89 M/UL (ref 4–5.4)
RBC #/AREA URNS HPF: 3 /HPF (ref 0–4)
SODIUM SERPL-SCNC: 140 MMOL/L (ref 136–145)
SP GR UR STRIP: >=1.03 (ref 1–1.03)
SQUAMOUS #/AREA URNS HPF: 5 /HPF
URN SPEC COLLECT METH UR: ABNORMAL
UROBILINOGEN UR STRIP-ACNC: NEGATIVE EU/DL
WBC # BLD AUTO: 11.74 K/UL (ref 3.9–12.7)
WBC #/AREA URNS HPF: 6 /HPF (ref 0–5)

## 2025-02-27 PROCEDURE — 81025 URINE PREGNANCY TEST: CPT | Performed by: NURSE PRACTITIONER

## 2025-02-27 PROCEDURE — 96374 THER/PROPH/DIAG INJ IV PUSH: CPT

## 2025-02-27 PROCEDURE — 63600175 PHARM REV CODE 636 W HCPCS: Performed by: NURSE PRACTITIONER

## 2025-02-27 PROCEDURE — 83690 ASSAY OF LIPASE: CPT | Performed by: NURSE PRACTITIONER

## 2025-02-27 PROCEDURE — 99285 EMERGENCY DEPT VISIT HI MDM: CPT | Mod: 25

## 2025-02-27 PROCEDURE — 80053 COMPREHEN METABOLIC PANEL: CPT | Performed by: NURSE PRACTITIONER

## 2025-02-27 PROCEDURE — 85025 COMPLETE CBC W/AUTO DIFF WBC: CPT | Performed by: NURSE PRACTITIONER

## 2025-02-27 PROCEDURE — 96361 HYDRATE IV INFUSION ADD-ON: CPT

## 2025-02-27 PROCEDURE — 25000003 PHARM REV CODE 250: Performed by: NURSE PRACTITIONER

## 2025-02-27 PROCEDURE — 25500020 PHARM REV CODE 255: Performed by: SURGERY

## 2025-02-27 PROCEDURE — 96372 THER/PROPH/DIAG INJ SC/IM: CPT | Performed by: NURSE PRACTITIONER

## 2025-02-27 PROCEDURE — 81000 URINALYSIS NONAUTO W/SCOPE: CPT | Performed by: NURSE PRACTITIONER

## 2025-02-27 RX ORDER — ONDANSETRON HYDROCHLORIDE 2 MG/ML
4 INJECTION, SOLUTION INTRAVENOUS
Status: COMPLETED | OUTPATIENT
Start: 2025-02-27 | End: 2025-02-27

## 2025-02-27 RX ORDER — DICYCLOMINE HYDROCHLORIDE 20 MG/1
20 TABLET ORAL 4 TIMES DAILY PRN
Qty: 20 TABLET | Refills: 0 | Status: SHIPPED | OUTPATIENT
Start: 2025-02-27 | End: 2025-03-04

## 2025-02-27 RX ORDER — SODIUM CHLORIDE 9 MG/ML
1000 INJECTION, SOLUTION INTRAVENOUS
Status: COMPLETED | OUTPATIENT
Start: 2025-02-27 | End: 2025-02-27

## 2025-02-27 RX ORDER — DICYCLOMINE HYDROCHLORIDE 10 MG/ML
20 INJECTION INTRAMUSCULAR
Status: COMPLETED | OUTPATIENT
Start: 2025-02-27 | End: 2025-02-27

## 2025-02-27 RX ORDER — ONDANSETRON 4 MG/1
4 TABLET, ORALLY DISINTEGRATING ORAL EVERY 8 HOURS PRN
Qty: 9 TABLET | Refills: 0 | Status: SHIPPED | OUTPATIENT
Start: 2025-02-27 | End: 2025-03-02

## 2025-02-27 RX ADMIN — DICYCLOMINE HYDROCHLORIDE 20 MG: 10 INJECTION, SOLUTION INTRAMUSCULAR at 03:02

## 2025-02-27 RX ADMIN — SODIUM CHLORIDE 1000 ML: 9 INJECTION, SOLUTION INTRAVENOUS at 01:02

## 2025-02-27 RX ADMIN — IOHEXOL 100 ML: 350 INJECTION, SOLUTION INTRAVENOUS at 03:02

## 2025-02-27 RX ADMIN — ONDANSETRON 4 MG: 2 INJECTION INTRAMUSCULAR; INTRAVENOUS at 01:02

## 2025-02-27 NOTE — ED PROVIDER NOTES
"Encounter Date: 2/27/2025       History     Chief Complaint   Patient presents with    Abdominal Pain     Pt with c/o abdominal pain for the past 4 days. Pt reports she has not went to restroom for a BM in over a week. Also reports c/o nausea and vomiting. Unable to keep any thing down.      Keyla Caal is a 34 y.o. female with PMH of anxiety, bipolar disorder, GERD, migraine headache presenting to the ED for evaluation of abdominal pain.  Patient presents with a 4 day history of colicky abdominal pain.  Pain is intermittent, waxes and wanes in intensity.  She currently rates pain 8/10 in severity.  She reports associated constipation. Last BM was approximately 1 week ago. Today, she had 1 episode of nonbilious, nonbloody emesis.  Denies previous history of abdominal surgeries. Denies fever.     The history is provided by the patient.     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, anger issues."     Fatty liver 6/29/2023    GERD (gastroesophageal reflux disease)     History of psychiatric hospitalization 2015    [facility]     Hx of psychiatric care 2015    Buspar, Vistril, Xanax, Paxil    [facility]     Migraines 2014    Primary Care doctor at [facility]     Oppositional defiant disorder 1999    'I spent only one day at [facility] [place] and was discharged."    Panic disorder 2015    [facility]    Psychiatric exam requested by authority 2015    [facility]    Psychiatric problem 2015    Depression and anxiety    Suicide attempt 2015    Interrupted Attempt    Therapy 2016    [facility]     Past Surgical " History:   Procedure Laterality Date    BARTHOLIN GLAND CYST EXCISION      BLADDER SUSPENSION      ESOPHAGOGASTRODUODENOSCOPY N/A 3/15/2023    Procedure: EGD (ESOPHAGOGASTRODUODENOSCOPY);  Surgeon: Nena Deras MD;  Location: [facility];  Service: Endoscopy;  Laterality: N/A;    KNEE SURGERY      right    LAPAROSCOPIC CHOLECYSTECTOMY N/A 02/03/2022    Procedure: CHOLECYSTECTOMY, LAPAROSCOPIC;  Surgeon: Ramy Mckay MD;  Location: [facility];  Service: General;  Laterality: N/A;    WISDOM TOOTH EXTRACTION       Family History   Problem Relation Name Age of Onset    Hypertension Mother Deidre     Anxiety disorder Mother Deidre     Hypertension Father Damion     No Known Problems Sister April     No Known Problems Maternal Aunt Kathia     No Known Problems Paternal Aunt Bettye     No Known Problems Maternal Uncle Junious     No Known Problems Paternal Uncle Arturo     Dementia Maternal Grandfather Junious,SR.     No Known Problems Maternal Grandmother Kaci     No Known Problems Paternal Grandfather Claude     No Known Problems Paternal Grandmother Ramsey     No Known Problems Cousin Violeta     No Known Problems Maternal Aunt Janessa     No Known Problems Maternal Aunt Pretty     No Known Problems Maternal Aunt Em     No Known Problems Maternal Uncle Shaheed      Social History[1]  Review of Systems   Constitutional:  Positive for appetite change. Negative for activity change, chills and fever.   HENT:  Negative for congestion, ear discharge, ear pain, postnasal drip, sinus pressure, sinus pain and sore throat.    Respiratory:  Negative for cough, chest tightness and shortness of breath.    Cardiovascular:  Negative for chest pain.   Gastrointestinal:  Positive for abdominal pain, constipation, nausea and vomiting. Negative for abdominal distention.   Genitourinary:  Negative for dysuria, frequency and urgency.   Musculoskeletal:  Negative for back pain.   Skin:  Negative for rash.   Neurological:  Negative for  dizziness, weakness, light-headedness and numbness.   Hematological:  Does not bruise/bleed easily.       Physical Exam     Initial Vitals [02/27/25 1257]   BP Pulse Resp Temp SpO2   (!) 160/87 80 20 98.2 °F (36.8 °C) 97 %      MAP       --         Physical Exam    Nursing note and vitals reviewed.  Constitutional: She appears well-developed and well-nourished.   HENT:   Head: Normocephalic and atraumatic.   Eyes: Conjunctivae and EOM are normal. Pupils are equal, round, and reactive to light.   Neck: Neck supple.   Cardiovascular:  Normal rate, regular rhythm, normal heart sounds and intact distal pulses.           Pulmonary/Chest: Breath sounds normal.   Abdominal: Abdomen is soft. Bowel sounds are normal. There is generalized abdominal tenderness. There is guarding.   Musculoskeletal:         General: Normal range of motion.      Cervical back: Neck supple.     Neurological: She is alert and oriented to person, place, and time. She has normal strength.   Skin: Skin is warm and dry.   Psychiatric: She has a normal mood and affect. Her behavior is normal. Judgment and thought content normal.         ED Course   Procedures  Labs Reviewed   CBC W/ AUTO DIFFERENTIAL - Abnormal       Result Value    WBC 11.74      RBC 4.89      Hemoglobin 14.6      Hematocrit 43.8      MCV 90      MCH 29.9      MCHC 33.3      RDW 13.2      Platelets 390      MPV 9.2      Immature Granulocytes 0.6 (*)     Gran # (ANC) 7.1      Immature Grans (Abs) 0.07 (*)     Lymph # 3.8      Mono # 0.7      Eos # 0.1      Baso # 0.05      nRBC 0      Gran % 60.5      Lymph % 32.0      Mono % 5.7      Eosinophil % 0.8      Basophil % 0.4      Differential Method Automated     COMPREHENSIVE METABOLIC PANEL - Abnormal    Sodium 140      Potassium 3.3 (*)     Chloride 104      CO2 25      Glucose 103      BUN 17      Creatinine 0.8      Calcium 8.9      Total Protein 7.5      Albumin 4.2      Total Bilirubin 0.4      Alkaline Phosphatase 74      AST 17       ALT 24      eGFR >60      Anion Gap 11     URINALYSIS, REFLEX TO URINE CULTURE - Abnormal    Specimen UA Urine, Clean Catch      Color, UA Yellow      Appearance, UA Clear      pH, UA 6.0      Specific Gravity, UA >=1.030 (*)     Protein, UA 1+ (*)     Glucose, UA Negative      Ketones, UA Negative      Bilirubin (UA) Negative      Occult Blood UA Negative      Nitrite, UA Negative      Urobilinogen, UA Negative      Leukocytes, UA Negative      Narrative:     Specimen Source->Urine   URINALYSIS MICROSCOPIC - Abnormal    RBC, UA 3      WBC, UA 6 (*)     Bacteria Few (*)     Squam Epithel, UA 5      Hyaline Casts, UA 1      Microscopic Comment Mucus      Narrative:     Specimen Source->Urine   LIPASE    Lipase 13     PREGNANCY TEST, URINE RAPID    Preg Test, Ur Negative      Narrative:     Specimen Source->Urine          Imaging Results              CT Abdomen Pelvis With IV Contrast NO Oral Contrast (Final result)  Result time 02/27/25 15:34:04      Final result by Carl Sharma Jr., MD (02/27/25 15:34:04)                   Impression:      Fatty infiltration of the liver parenchyma.  Prior cholecystectomy.  2.9 cm left ovarian cyst.  Probable 2.4 cm left Bartholin's gland cyst.  IUD in the uterus.      Electronically signed by: Carl Sharma MD  Date:    02/27/2025  Time:    15:34               Narrative:    EXAMINATION:  CT ABDOMEN PELVIS WITH IV CONTRAST    CLINICAL HISTORY:  Abdominal pain, acute, nonlocalized;    TECHNIQUE:  Low dose axial images, sagittal and coronal reformations were obtained from the lung bases to the pubic symphysis following the IV administration of 100 mL of Omnipaque 350    COMPARISON:  Chest x-ray of November 22, 2024, CT abdomen October 9, 2022.    FINDINGS:  The liver is of normal size and contour but hypodense in relation to the spleen consistent with fatty infiltration.  A focal liver mass is not seen.  The gallbladder is absent with surgical clips noted.  The pancreas  is of normal contour and CT density without edema or mass.  The spleen is of normal size and CT density.    The adrenal glands are not enlarged.  The kidneys are of normal size contour and contrast enhancement without mass or hydronephrosis.  The abdominal aorta and inferior vena cava are of normal caliber.    The stomach is of normal configuration.  Small bowel dilatation or air-fluid levels are not seen.  The colon appears of normal configuration without distention or mass.  A normal appendix is seen.  Free fluid or free air is not identified.    The bladder is of normal contour without mass or asymmetry.  An IUD is noted in the normal size uterus.  There is a  2.9 cm cyst of the left ovary.  There is a small cyst of the left side of the vagina measuring 2.4 cm a probable Bartholin's gland cyst.                                       Medications   0.9% NaCl infusion (0 mLs Intravenous Stopped 2/27/25 1419)   ondansetron injection 4 mg (4 mg Intravenous Given 2/27/25 1317)   dicyclomine injection 20 mg (20 mg Intramuscular Given 2/27/25 1528)   iohexoL (OMNIPAQUE 350) injection 100 mL (100 mLs Intravenous Given 2/27/25 1527)     Medical Decision Making  Evaluation of a 34-year-old female with generalized abdominal pain, vomiting, and constipation  Presents nontoxic appearing with stable vital signs  Physical exam with generalized abdominal tenderness with guarding. Active BS in all 4 quadrants     Differential diagnosis includes viral gastroenteritis, colitis, diverticulitis, appendicitis, pancreatitis, cholecystitis, UTI, ovarian cyst, dehydration    Amount and/or Complexity of Data Reviewed  Labs: ordered. Decision-making details documented in ED Course.  Radiology: ordered. Decision-making details documented in ED Course.    Risk  Prescription drug management.  Risk Details: Stable for discharge home.  Lab workup with no gross abnormalities.  CT abdomen pelvis shows 2.9 cm left ovarian cyst and 2.4 cm left  Bartholin cyst. No vaginal complaints. Feeling much better with IV fluids, Zofran, and Bentyl.  Complete resolution of pain.  Will discharge home with symptomatic treatment for viral illness.  Will need outpatient follow-up with gyn for both ovarian cyst and Bartholin cyst.  Patient/caregiver voices understanding and feels comfortable with discharge plan.      The patient acknowledges that close follow up with medical provider is required. Instructed to follow up with PCP within 2 days. Patient was given specific return precautions. The patient agrees to comply with all instruction and directions given in the ER.                                        Clinical Impression:  Final diagnoses:  [N83.202] Ovarian cyst, left (Primary)  [R11.2] Nausea and vomiting, unspecified vomiting type  [N75.0] Bartholin gland cyst          ED Disposition Condition    Discharge Stable          ED Prescriptions       Medication Sig Dispense Start Date End Date Auth. Provider    ondansetron (ZOFRAN-ODT) 4 MG TbDL Take 1 tablet (4 mg total) by mouth every 8 (eight) hours as needed (nausea). 9 tablet 2/27/2025 3/2/2025 Cintia Tee NP    dicyclomine (BENTYL) 20 mg tablet Take 1 tablet (20 mg total) by mouth 4 (four) times daily as needed (abdominal cramping). Take 30 mins before meals and then at bedtime. 20 tablet 2/27/2025 3/4/2025 Cintia Tee NP          Follow-up Information       Follow up With Specialties Details Why Contact Info Additional Information    [facility] Gynecology Schedule an appointment as soon as possible for a visit in 2 days  [address] [id number] [phone] [facility]                 [1]   Social History  Tobacco Use    Smoking status: Former     Current packs/day: 0.00     Average packs/day: 1 pack/day for 12.1 years (12.1 ttl pk-yrs)     Types: Cigarettes, Vaping with nicotine     Start date: 1/20/2011     Quit date: 03/2023     Years since  quittin.9    Smokeless tobacco: Never   Substance Use Topics    Alcohol use: Yes     Alcohol/week: 2.0 standard drinks of alcohol     Types: 1 Glasses of wine, 1 Standard drinks or equivalent per week     Comment: occasionally about once a month    Drug use: Yes     Types: Marijuana     Comment: Cintia Choudhury NP  [phone]

## 2025-02-27 NOTE — Clinical Note
"Keyla"Guerline Caal was seen and treated in our emergency department on 2/27/2025.  She may return to work on 02/28/2025.       If you have any questions or concerns, please don't hesitate to call.      Cintia Tee NP"

## 2025-03-06 ENCOUNTER — PATIENT OUTREACH (OUTPATIENT)
Facility: OTHER | Age: 35
End: 2025-03-06
Payer: MEDICARE

## 2025-03-06 NOTE — PROGRESS NOTES
Pt is doing okay. Pt confirmed she does want to follow-up with gynecologist, so the following message was sent to the women's clinic @ [facility]: Good morning, pt went into ER at [facility] recently and is needing to schedule an appt., if someone can please give her a call. Thanks in advance! Pt additionally may want [facility] assistance program info., and other resources; however, is on vacation and asked for call back on Monday.    Janee King  ED Navigator  [phone]

## 2025-03-10 NOTE — PROGRESS NOTES
Janee King, Patient Care Assistant  ED Navigator  Emergency Department    Project: [facility] ED Navigator  Role: Community Health Worker    Date: 03/10/2025  Patient Name: Keyla Caal  MRN: [id number]  PCP: Isabelle Vasquez NP    Assessment:     Keyla Caal is a 34 y.o. female who has presented to ED for abdominal pain. Patient has visited the ED 1 times in the past 3 months. Patient did not contact PCP.     ED Navigator Initial Assessment    ED Navigator Enrollment Documentation  Consent to Services  Does patient consent to completing the assessment?: Yes  Contact  Method of Initial Contact: Phone  Transportation  Insurance Coverage  Do you have coverage/adequate coverage?: Yes  Specialist Appointment  Did the patient come to the ED to see a specialist?: No  Does the patient have a specialist appointment made?: Yes  PCP Follow Up Appointment  Medications  Is patient able to afford medication?: Yes  Psychological  Food  Communication/Education  Other Financial Concerns  Other Social Barriers/Concerns  Primary Barrier  Scheduled Appointment Date: 3/18/25  Plan: Provided information for [facility]  Nurse triage line, [phone] or [phone] ([phone])         Social History     Socioeconomic History    Marital status:      Spouse name: Maritza   Tobacco Use    Smoking status: Former     Current packs/day: 0.00     Average packs/day: 1 pack/day for 12.1 years (12.1 ttl pk-yrs)     Types: Cigarettes, Vaping with nicotine     Start date: 2011     Quit date: 2023     Years since quittin.0    Smokeless tobacco: Never   Substance and Sexual Activity    Alcohol use: Yes     Alcohol/week: 2.0 standard drinks of alcohol     Types: 1 Glasses of wine, 1 Standard drinks or equivalent per week     Comment: occasionally about once a month    Drug use: Yes     Types: Marijuana     Comment: gummies    Sexual activity: Yes     Partners: Female     Birth control/protection: None   Other  Topics Concern    Patient feels they ought to cut down on drinking/drug use No    Patient annoyed by others criticizing their drinking/drug use No    Patient has felt bad or guilty about drinking/drug use No    Patient has had a drink/used drugs as an eye opener in the AM No     Social Drivers of Health     Financial Resource Strain: Low Risk  (3/10/2025)    Overall Financial Resource Strain (CARDIA)     Difficulty of Paying Living Expenses: Not hard at all   Food Insecurity: No Food Insecurity (3/10/2025)    Hunger Vital Sign     Worried About Running Out of Food in the Last Year: Never true     Ran Out of Food in the Last Year: Never true   Transportation Needs: No Transportation Needs (3/10/2025)    PRAPARE - Transportation     Lack of Transportation (Medical): No     Lack of Transportation (Non-Medical): No   Physical Activity: Patient Unable To Answer (3/10/2025)    Exercise Vital Sign     Days of Exercise per Week: Patient unable to answer     Minutes of Exercise per Session: Patient unable to answer   Stress: Stress Concern Present (3/10/2025)    [employer] [place] [employer] - Occupational Stress Questionnaire     Feeling of Stress : Very much   Housing Stability: Low Risk  (3/10/2025)    Housing Stability Vital Sign     Unable to Pay for Housing in the Last Year: No     Number of Times Moved in the Last Year: 1     Homeless in the Last Year: No       Plan:     Pt is doing okay. Pt was not aware of the gynecologist appointment scheduled for her next week, but will be able to attend. Pt ended up not needing any resources after discussion. Pt is needing her prescription sent to a pharmacy closer to her home by her pulmonologist and inquired if she could be assisted. ED navigator sent the following to Dr. Parra's staff: Good afternoon, pt has been prescribed dupilumab (DUPIXENT PEN) 300 mg/2 mL PnIj on 2/19/25 to [facility] in [place], [place] but is needing it sent to one close to home, as in [place], [place].  It looks like someone had been trying to get in touch with pt, but she was on vacation. Can someone please give her a call to discuss getting this sent to the pharmacy near her home? Thanks in advance. ED navigator ensured patient had no other needs at this time. ED navigator reminded patient to reach out if there is ever anything she can assist with. ED navigator plans follow-up with patient on/around 3/17/2025.    Janee King  ED Navigator  [phone]

## 2025-03-10 NOTE — PROGRESS NOTES
Janee King, Patient Care Assistant  Mahi Cooper, RRT  I had no clue. Thanks for the information, and thank you so much for reaching out the pt too! :)   ----- Message -----  From: Mahi Cooper RRT  Sent: 3/10/2025   3:53 PM CDT  To: Janee King Patient Care Assistant  Subject: RE: Change pharmacy.                            Nroman [place],    Unfortunately only specialty pharmacies dispense the medication, but it will be mailed to her regardless. I will call and let her know the same. Thanks!

## 2025-03-17 ENCOUNTER — PATIENT OUTREACH (OUTPATIENT)
Facility: OTHER | Age: 35
End: 2025-03-17
Payer: MEDICARE

## 2025-03-17 NOTE — PROGRESS NOTES
Pt did not answer for follow-up call, so was reminded via VM of her appointment on tomorrow with [facility] @ 12:00 p.m. ED navigator plans to follow-up with patient again on/around 4/14/2025.    Janee King  ED Navigator  [phone]

## 2025-04-16 ENCOUNTER — PATIENT OUTREACH (OUTPATIENT)
Facility: OTHER | Age: 35
End: 2025-04-16
Payer: MEDICARE

## 2025-04-16 NOTE — PROGRESS NOTES
Pt is doing well. Pt seen dentist today and has all of her other appointments taken care of. ED navigator ensured there was nothing she could help patient with today. ED navigator reminded patient to reach out if there is ever anything she can assist with. ED navigator plans to follow-up with patient on/around 6/11/2025.    Janee King  ED Navigator  [phone]

## 2025-06-26 ENCOUNTER — PATIENT OUTREACH (OUTPATIENT)
Facility: OTHER | Age: 35
End: 2025-06-26
Payer: MEDICARE

## 2025-06-26 NOTE — PROGRESS NOTES
Pt is doing okay. Pt expressed she has been seeing her PCP for her knee and would really need to see orthopedics instead. Pt does have a referral from 2/11/2025, was unaware, and was never scheduled an appt. Pt agreed to assistance with appt., and referral being sent. ED navigator called [facility] and sent pt's referral to [phone]. ED navigator ensured pt would be able to be seen for knee also, as referral says hand. ED navigator also informed pt of this. ED navigator ensured patient had no other needs at this time. ED navigator reminded patient to reach out if there is ever anything she can assist with. ED navigator plans to follow-up with patient on/around 7/3/2025.    Janee King  ED Navigator  [phone]

## 2025-07-03 ENCOUNTER — PATIENT OUTREACH (OUTPATIENT)
Facility: OTHER | Age: 35
End: 2025-07-03
Payer: MEDICARE

## 2025-07-03 NOTE — PROGRESS NOTES
Pt is unreachable for follow-up call. ED navigator plans to follow-up with patient again on/around 7/17/2025.    Janee King  ED Navigator  [phone]

## 2025-07-21 ENCOUNTER — PATIENT OUTREACH (OUTPATIENT)
Facility: OTHER | Age: 35
End: 2025-07-21
Payer: MEDICARE

## 2025-07-21 NOTE — PROGRESS NOTES
Pt is doing okay. Pt was able to get scheduled with [facility] for tomorrow. ED navigator ensured there was nothing she could help patient with today. ED navigator reminded patient to reach out if there is ever anything she can assist with. ED navigator plans to follow-up with patient on/around 7/31/2025.    Janee King  ED Navigator  [phone]

## 2025-08-05 ENCOUNTER — PATIENT OUTREACH (OUTPATIENT)
Facility: OTHER | Age: 35
End: 2025-08-05
Payer: MEDICARE

## 2025-08-05 NOTE — PROGRESS NOTES
Pt attended her orthopedic appointment with [facility]. Pt expressed she was taught something she did not know--that her knee cap comes out of place when she walks. Pt was ordered PT and anti-inflammatories. Pt did not need help with scheduling PT at this time, as was informed the clinic would take care of it for her. ED navigator ensured there was nothing she could help patient with today. ED navigator reminded patient to reach out if there is ever anything she can assist with. ED navigator plans to follow-up with patient on/around 8/26/2025.    Janee King  ED Navigator  [phone]

## 2025-08-26 ENCOUNTER — PATIENT OUTREACH (OUTPATIENT)
Facility: OTHER | Age: 35
End: 2025-08-26
Payer: MEDICARE